# Patient Record
Sex: FEMALE | Race: WHITE | Employment: FULL TIME | URBAN - METROPOLITAN AREA
[De-identification: names, ages, dates, MRNs, and addresses within clinical notes are randomized per-mention and may not be internally consistent; named-entity substitution may affect disease eponyms.]

---

## 2017-11-07 ENCOUNTER — OFFICE VISIT (OUTPATIENT)
Dept: ORTHOPEDIC SURGERY | Age: 37
End: 2017-11-07

## 2017-11-07 VITALS
DIASTOLIC BLOOD PRESSURE: 78 MMHG | BODY MASS INDEX: 30.22 KG/M2 | HEART RATE: 74 BPM | HEIGHT: 61 IN | SYSTOLIC BLOOD PRESSURE: 112 MMHG | WEIGHT: 160.05 LBS

## 2017-11-07 DIAGNOSIS — G89.29 CHRONIC PAIN OF LEFT ANKLE: Primary | ICD-10-CM

## 2017-11-07 DIAGNOSIS — M25.572 CHRONIC PAIN OF LEFT ANKLE: Primary | ICD-10-CM

## 2017-11-07 PROCEDURE — 4004F PT TOBACCO SCREEN RCVD TLK: CPT | Performed by: PODIATRIST

## 2017-11-07 PROCEDURE — L1902 AFO ANKLE GAUNTLET PRE OTS: HCPCS | Performed by: PODIATRIST

## 2017-11-07 PROCEDURE — 99203 OFFICE O/P NEW LOW 30 MIN: CPT | Performed by: PODIATRIST

## 2017-11-07 PROCEDURE — G8484 FLU IMMUNIZE NO ADMIN: HCPCS | Performed by: PODIATRIST

## 2017-11-07 PROCEDURE — G8427 DOCREV CUR MEDS BY ELIG CLIN: HCPCS | Performed by: PODIATRIST

## 2017-11-07 PROCEDURE — G8417 CALC BMI ABV UP PARAM F/U: HCPCS | Performed by: PODIATRIST

## 2017-11-07 NOTE — PROGRESS NOTES
HISTORY OF PRESENT ILLNESS: This is an initial visit for a 72-year-old female with a chief complaint of left lateral ankle and foot pain. There is no history of recent ankle injury. In the July, she slipped all walking down the steps on her back deck. She went to the ER at that time was diagnosed with a sprain. She use an Aircast ankle brace for a short time which seemed to help, however she stopped using that. Pain is present with weightbearing and twisting motions of the ankle. The pain is best relieved with rest, ice, and elevation. She states that her pain level now can get up to 9/10. FAMILY HISTORY: Documented in chart. SOCIAL HISTORY:  Documented in chart. REVIEW OF SYSTEMS:  The patient denies any problems with cardiovascular, pulmonary, gastrointestinal, neurologic, urologic, genitourinary, psychiatric, dermatologic, and HEENT systems. PHYSICAL EXAMINATION: The area of greatest palpable tenderness is at the  left lateral ankle, over the ATF ligament. An anterior drawer test does  not reproduce any gross instability. There is mild edema of the  lateral ankle. No open lesions or fracture blisters are present. Neurovascular  status is grossly intact to the foot and ankle. There is no pain over the deltoid  ligament. There is no pain over the Achilles tendon and this is palpated to be  intact. Fengs test is negative for a complete rupture of the Achilles  tendon. The patient is able to dorsiflex and plantarflex the foot, as well as  sanjeev and invert independently. RADIOGRAPHS: Three weightbearing x-ray views of the left ankle were evaluated. No acute fractures or dislocations are noted. The ankle joint is in  excellent alignment. ASSESSMENT: Chronic Lateral Ankle Sprain with left ankle pain. PLAN: The patient was educated on the pathology and its treatment options. Physical therapy was prescribed for the patient to rehabilitate the ankle.     An Aircast ankle brace was applied to the patient's left ankle. Rest, ice, and elevation are to be used to relieve pain. Overall activity is to be decreased  in the short-term. The patient will return after physical therapy. Procedures    Aircast Air Sport Ankle Brace     Patient was prescribed an Aircast Air Sport Brace. The left ankle will require stabilization / immobilization from this semi-rigid / rigid orthosis to improve their function. The orthosis will assist in protecting the affected area, provide functional support and facilitate healing. The patient was educated and fit by a healthcare professional with expert knowledge and specialization in brace application while under the direct supervision of the treating physician. Verbal and written instructions for the use of and application of this item were provided. They were instructed to contact the office immediately should the brace result in increased pain, decreased sensation, increased swelling or worsening of the condition.

## 2019-07-21 ENCOUNTER — HOSPITAL ENCOUNTER (EMERGENCY)
Age: 39
Discharge: HOME | End: 2019-07-21
Attending: EMERGENCY MEDICINE

## 2019-07-21 VITALS
OXYGEN SATURATION: 98 % | TEMPERATURE: 98.6 F | BODY MASS INDEX: 33.99 KG/M2 | WEIGHT: 180 LBS | HEIGHT: 61 IN | HEART RATE: 70 BPM | RESPIRATION RATE: 16 BRPM | DIASTOLIC BLOOD PRESSURE: 92 MMHG | SYSTOLIC BLOOD PRESSURE: 128 MMHG

## 2019-07-21 DIAGNOSIS — K04.7 DENTAL INFECTION: Primary | ICD-10-CM

## 2019-07-21 PROCEDURE — 99282 EMERGENCY DEPT VISIT SF MDM: CPT

## 2019-07-21 PROCEDURE — 6370000000 HC RX 637 (ALT 250 FOR IP): Performed by: EMERGENCY MEDICINE

## 2019-07-21 RX ORDER — CLINDAMYCIN HYDROCHLORIDE 150 MG/1
450 CAPSULE ORAL ONCE
Status: COMPLETED | OUTPATIENT
Start: 2019-07-21 | End: 2019-07-21

## 2019-07-21 RX ORDER — CLINDAMYCIN HYDROCHLORIDE 150 MG/1
450 CAPSULE ORAL 3 TIMES DAILY
Qty: 63 CAPSULE | Refills: 0 | Status: SHIPPED | OUTPATIENT
Start: 2019-07-21 | End: 2019-07-28

## 2019-07-21 RX ORDER — OXYCODONE HYDROCHLORIDE AND ACETAMINOPHEN 5; 325 MG/1; MG/1
1 TABLET ORAL EVERY 6 HOURS PRN
Qty: 6 TABLET | Refills: 0 | Status: SHIPPED | OUTPATIENT
Start: 2019-07-21 | End: 2019-07-24

## 2019-07-21 RX ADMIN — CLINDAMYCIN HYDROCHLORIDE 450 MG: 150 CAPSULE ORAL at 09:04

## 2019-07-21 ASSESSMENT — PAIN SCALES - GENERAL
PAINLEVEL_OUTOF10: 4
PAINLEVEL_OUTOF10: 4

## 2019-07-21 ASSESSMENT — PAIN DESCRIPTION - PAIN TYPE
TYPE: ACUTE PAIN
TYPE: ACUTE PAIN

## 2019-07-21 ASSESSMENT — PAIN DESCRIPTION - FREQUENCY
FREQUENCY: INTERMITTENT
FREQUENCY: INTERMITTENT

## 2019-07-21 ASSESSMENT — PAIN DESCRIPTION - DESCRIPTORS
DESCRIPTORS: ACHING;DISCOMFORT
DESCRIPTORS: ACHING;DISCOMFORT

## 2019-07-21 ASSESSMENT — PAIN DESCRIPTION - ORIENTATION
ORIENTATION: RIGHT
ORIENTATION: RIGHT

## 2019-07-21 ASSESSMENT — PAIN DESCRIPTION - LOCATION
LOCATION: TEETH;EAR
LOCATION: EAR

## 2019-07-21 NOTE — ED PROVIDER NOTES
1\" (1.549 m)   Wt 180 lb (81.6 kg)   LMP 07/01/2019   SpO2 98%   BMI 34.01 kg/m²   GENERAL APPEARANCE: Awake and alert. Cooperative. In mild pain distress. HEAD: Normocephalic. Atraumatic. EYES: PERRL. EOM's grossly intact. ENT: Mucous membranes are pink and moist.  Right upper mild gum swelling, but no area of fluctuance. Tender in the right upper molar area which reproduces symptoms. Bilateral tympanic membranes normal  NECK: Supple. HEART: RRR. No   LUNGS: Respirations unlabored. Good air exchange. ABDOMEN: Soft. Non-distended. Non-tender. No masses. No organomegaly. No guarding or rebound. EXTREMITIES: No peripheral edema. Moves all extremities equally. All extremities neurovascularly intact. SKIN: Warm and dry. No acute rashes. NEUROLOGICAL: Alert and oriented. Strength 5/5, sensation intact. Gait normal.   PSYCHIATRIC: Normal mood and affect. No HI or SI expressed to me. RADIOLOGY    See below     EKG:     See below      ED COURSE/MDM    Patient with dental pain, tenderness consistent with possible dental infection. Will treat as such and referred to dentistry. Given brief course of pain medication until she can get into see them. Discharged in stable condition         Old records were reviewed when applicable.  The ED course and plan were reviewed and results discussed with the patient    CLINICAL IMPRESSION and DISPOSITION  Alex Huang was stable and diagnosed with right upper dental infection    Patient was treated with clindamycin, PRN pain medication      CRITICAL CARE TIME:   N/A                      Bill Marcus DO  07/21/19 8650

## 2019-07-22 ENCOUNTER — HOSPITAL ENCOUNTER (EMERGENCY)
Age: 39
Discharge: HOME | End: 2019-07-22
Attending: EMERGENCY MEDICINE

## 2019-07-22 VITALS
WEIGHT: 175 LBS | HEIGHT: 61 IN | HEART RATE: 70 BPM | DIASTOLIC BLOOD PRESSURE: 107 MMHG | BODY MASS INDEX: 33.04 KG/M2 | SYSTOLIC BLOOD PRESSURE: 136 MMHG | RESPIRATION RATE: 18 BRPM | TEMPERATURE: 98.4 F | OXYGEN SATURATION: 100 %

## 2019-07-22 DIAGNOSIS — K04.7 DENTAL ABSCESS: Primary | ICD-10-CM

## 2019-07-22 PROCEDURE — 96372 THER/PROPH/DIAG INJ SC/IM: CPT

## 2019-07-22 PROCEDURE — 99283 EMERGENCY DEPT VISIT LOW MDM: CPT

## 2019-07-22 PROCEDURE — 6360000002 HC RX W HCPCS: Performed by: EMERGENCY MEDICINE

## 2019-07-22 RX ORDER — KETOROLAC TROMETHAMINE 30 MG/ML
30 INJECTION, SOLUTION INTRAMUSCULAR; INTRAVENOUS ONCE
Status: COMPLETED | OUTPATIENT
Start: 2019-07-22 | End: 2019-07-22

## 2019-07-22 RX ORDER — MELOXICAM 7.5 MG/1
7.5 TABLET ORAL DAILY
Qty: 30 TABLET | Refills: 3 | Status: SHIPPED | OUTPATIENT
Start: 2019-07-22 | End: 2020-06-15

## 2019-07-22 RX ADMIN — KETOROLAC TROMETHAMINE 30 MG: 30 INJECTION, SOLUTION INTRAMUSCULAR at 19:55

## 2019-07-22 ASSESSMENT — PAIN DESCRIPTION - DESCRIPTORS: DESCRIPTORS: ACHING;THROBBING

## 2019-07-22 ASSESSMENT — PAIN SCALES - GENERAL: PAINLEVEL_OUTOF10: 9

## 2019-07-22 ASSESSMENT — PAIN DESCRIPTION - PROGRESSION: CLINICAL_PROGRESSION: GRADUALLY WORSENING

## 2019-07-22 ASSESSMENT — PAIN DESCRIPTION - ONSET: ONSET: GRADUAL

## 2019-07-22 ASSESSMENT — PAIN DESCRIPTION - LOCATION: LOCATION: TEETH

## 2019-07-22 ASSESSMENT — PAIN DESCRIPTION - PAIN TYPE: TYPE: ACUTE PAIN

## 2019-07-22 ASSESSMENT — PAIN DESCRIPTION - ORIENTATION: ORIENTATION: RIGHT;UPPER

## 2019-07-22 NOTE — ED PROVIDER NOTES
the clindamycin 1 day to try to work which is definitely not enough time. She is given Percocet by Dr. Saira Gamez for which she does have some more. I think she had a misunderstanding as to how long it might take the antibiotics to work. She did try to get into the dentist today which was unsuccessful but she does have an appointment for Thursday.   So she should follow-up with the dentist I will add a nonsteroidal anti-inflammatory to the treatment regimen         The ED course and plan were reviewed and results discussed with the patient      CLINICAL IMPRESSION and DISPOSITION    [facility] was stable and diagnosed with dental abscess    Patient was treated with Toradol     Yasmani Fregoso MD  07/22/19 1947

## 2020-06-15 ENCOUNTER — HOSPITAL ENCOUNTER (EMERGENCY)
Age: 40
Discharge: HOME | End: 2020-06-15

## 2020-06-15 VITALS
RESPIRATION RATE: 16 BRPM | WEIGHT: 155 LBS | BODY MASS INDEX: 29.27 KG/M2 | TEMPERATURE: 99.1 F | HEIGHT: 61 IN | OXYGEN SATURATION: 96 % | SYSTOLIC BLOOD PRESSURE: 107 MMHG | HEART RATE: 85 BPM | DIASTOLIC BLOOD PRESSURE: 85 MMHG

## 2020-06-15 PROCEDURE — 99282 EMERGENCY DEPT VISIT SF MDM: CPT

## 2020-06-15 PROCEDURE — 6370000000 HC RX 637 (ALT 250 FOR IP): Performed by: PHYSICIAN ASSISTANT

## 2020-06-15 RX ORDER — CLINDAMYCIN HYDROCHLORIDE 150 MG/1
300 CAPSULE ORAL ONCE
Status: COMPLETED | OUTPATIENT
Start: 2020-06-15 | End: 2020-06-15

## 2020-06-15 RX ORDER — NAPROXEN 500 MG/1
500 TABLET ORAL 2 TIMES DAILY WITH MEALS
Qty: 20 TABLET | Refills: 0 | Status: SHIPPED | OUTPATIENT
Start: 2020-06-15 | End: 2020-11-06 | Stop reason: ALTCHOICE

## 2020-06-15 RX ORDER — CLINDAMYCIN HYDROCHLORIDE 300 MG/1
300 CAPSULE ORAL 3 TIMES DAILY
Qty: 30 CAPSULE | Refills: 0 | Status: SHIPPED | OUTPATIENT
Start: 2020-06-15 | End: 2020-06-25

## 2020-06-15 RX ORDER — NAPROXEN 250 MG/1
500 TABLET ORAL ONCE
Status: COMPLETED | OUTPATIENT
Start: 2020-06-15 | End: 2020-06-15

## 2020-06-15 RX ADMIN — CLINDAMYCIN HYDROCHLORIDE 300 MG: 150 CAPSULE ORAL at 19:20

## 2020-06-15 RX ADMIN — NAPROXEN 500 MG: 250 TABLET ORAL at 19:20

## 2020-06-15 ASSESSMENT — PAIN DESCRIPTION - FREQUENCY: FREQUENCY: CONTINUOUS

## 2020-06-15 ASSESSMENT — PAIN DESCRIPTION - PROGRESSION: CLINICAL_PROGRESSION: GRADUALLY WORSENING

## 2020-06-15 ASSESSMENT — PAIN DESCRIPTION - ONSET: ONSET: SUDDEN

## 2020-06-15 ASSESSMENT — PAIN DESCRIPTION - PAIN TYPE: TYPE: ACUTE PAIN

## 2020-06-15 ASSESSMENT — ENCOUNTER SYMPTOMS
SHORTNESS OF BREATH: 0
TRISMUS: 0
VOMITING: 0
TROUBLE SWALLOWING: 0

## 2020-06-15 ASSESSMENT — PAIN DESCRIPTION - ORIENTATION: ORIENTATION: LEFT;LOWER

## 2020-06-15 ASSESSMENT — PAIN DESCRIPTION - DESCRIPTORS: DESCRIPTORS: DULL;SHARP

## 2020-06-15 ASSESSMENT — PAIN - FUNCTIONAL ASSESSMENT: PAIN_FUNCTIONAL_ASSESSMENT: PREVENTS OR INTERFERES SOME ACTIVE ACTIVITIES AND ADLS

## 2020-06-15 ASSESSMENT — PAIN DESCRIPTION - LOCATION: LOCATION: JAW;TEETH;EAR

## 2020-06-15 ASSESSMENT — PAIN SCALES - GENERAL
PAINLEVEL_OUTOF10: 9

## 2020-06-15 ASSESSMENT — ACTIVITIES OF DAILY LIVING (ADL): EFFECT OF PAIN ON DAILY ACTIVITIES: DECREASE IN APPETITE

## 2020-06-15 NOTE — ED PROVIDER NOTES
contractions     was on PO Brethine. Stopped at 36 wks    Smoker          SURGICAL HISTORY     Past Surgical History:   Procedure Laterality Date    CHOLECYSTECTOMY      TONSILLECTOMY      TYMPANOSTOMY TUBE PLACEMENT           [address]       Discharge Medication List as of 6/15/2020  7:18 PM      CONTINUE these medications which have NOT CHANGED    Details   ibuprofen (ADVIL;MOTRIN) 800 MG tablet Take 1 tablet by mouth every 8 hours as needed for Pain, Disp-30 tablet, R-0Print      fluticasone (FLONASE) 50 MCG/ACT nasal spray 1 spray by Nasal route daily, Disp-1 Bottle, R-0Print               ALLERGIES     Amoxicillin; Codeine; Erythrocin; Lorabid [loracarbef];  Penicillins; and Sulfa antibiotics    FAMILYHISTORY       Family History   Problem Relation Age of Onset    Arthritis Mother     Cancer Maternal Grandfather         bladder    High Blood Pressure Maternal Grandmother     Stroke Maternal Grandmother           SOCIAL HISTORY       Social History     Socioeconomic History    Marital status:      Spouse name: None    Number of children: None    Years of education: None    Highest education level: None   Occupational History    None   Social Needs    Financial resource strain: None    Food insecurity     Worry: None     Inability: None    Transportation needs     Medical: None     Non-medical: None   Tobacco Use    Smoking status: Current Some Day Smoker     Packs/day: 0.50     Years: 5.00     Pack years: 2.50     Types: Cigarettes    Smokeless tobacco: Never Used   Substance and Sexual Activity    Alcohol use: Yes     Comment: rarely    Drug use: No    Sexual activity: Yes     Partners: Male   Lifestyle    Physical activity     Days per week: None     Minutes per session: None    Stress: None   Relationships    Social connections     Talks on phone: None     Gets together: None     Attends Moravian service: None     Active member of club or organization: None Attends meetings of clubs or organizations: None     Relationship status: None    Intimate partner violence     Fear of current or ex partner: None     Emotionally abused: None     Physically abused: None     Forced sexual activity: None   Other Topics Concern    None   Social History Narrative    None       SCREENINGS             PHYSICAL EXAM    (up to 7 for level 4, 8 or more for level 5)     ED Triage Vitals [06/15/20 1823]   BP Temp Temp Source Pulse Resp SpO2 Height Weight   (!) 123/92 99.1 °F (37.3 °C) Oral 76 16 94 % 5' 1\" (1.549 m) 155 lb (70.3 kg)       Physical Exam  Vitals signs and nursing note reviewed. Constitutional:       Appearance: She is well-developed. She is not ill-appearing or toxic-appearing. HENT:      Head: Normocephalic and atraumatic. Right Ear: Tympanic membrane and ear canal normal. No drainage or swelling. Left Ear: Tympanic membrane normal. No drainage or swelling. Ears:      Comments: Cerumen left ear canal partially obscuring view of TM what I can see appears normal without erythema. Mouth/Throat:      Mouth: Mucous membranes are moist. No angioedema. Dentition: Abnormal dentition. Dental tenderness and dental caries present. Tongue: Tongue does not deviate from midline. Pharynx: Oropharynx is clear. Uvula midline. No pharyngeal swelling, oropharyngeal exudate, posterior oropharyngeal erythema or uvula swelling. Neck:      Musculoskeletal: Normal range of motion and neck supple. Cardiovascular:      Rate and Rhythm: Normal rate and regular rhythm. Heart sounds: Normal heart sounds. Pulmonary:      Effort: Pulmonary effort is normal. No respiratory distress. Breath sounds: Normal breath sounds. No stridor. No wheezing, rhonchi or rales. Skin:     General: Skin is warm and dry. Neurological:      Mental Status: She is alert and oriented to person, place, and time. Motor: No abnormal muscle tone.    Psychiatric:

## 2020-11-06 ENCOUNTER — OFFICE VISIT (OUTPATIENT)
Dept: ORTHOPEDIC SURGERY | Age: 40
End: 2020-11-06
Payer: MEDICAID

## 2020-11-06 ENCOUNTER — TELEPHONE (OUTPATIENT)
Dept: ORTHOPEDIC SURGERY | Age: 40
End: 2020-11-06

## 2020-11-06 VITALS — BODY MASS INDEX: 29.27 KG/M2 | HEIGHT: 61 IN | WEIGHT: 155 LBS

## 2020-11-06 PROCEDURE — 4004F PT TOBACCO SCREEN RCVD TLK: CPT | Performed by: ORTHOPAEDIC SURGERY

## 2020-11-06 PROCEDURE — G8419 CALC BMI OUT NRM PARAM NOF/U: HCPCS | Performed by: ORTHOPAEDIC SURGERY

## 2020-11-06 PROCEDURE — 99203 OFFICE O/P NEW LOW 30 MIN: CPT | Performed by: ORTHOPAEDIC SURGERY

## 2020-11-06 PROCEDURE — G8484 FLU IMMUNIZE NO ADMIN: HCPCS | Performed by: ORTHOPAEDIC SURGERY

## 2020-11-06 PROCEDURE — G8428 CUR MEDS NOT DOCUMENT: HCPCS | Performed by: ORTHOPAEDIC SURGERY

## 2020-11-06 NOTE — PROGRESS NOTES
Chief Complaint   Patient presents with    New Patient     Rt Knee: Ongoing chronic pain, was a previous cheerleader and played softball, was told years ago that her cartialge was fraying, has current c/o patella pain, popping, pain with going up and down stairs       HISTORY OF PRESENT ILLNESS    Flor Leonardo is a 47 y.o. female. Presents for evaluation of her right knee. She states for years she is had some chronic pain in the anterolateral aspect of her knee, but last couple weeks she had a fall and since that time is been hurting worse. She has had some popping and a mild swelling. No significant locking or instability. No prior surgeries. She states that years ago she was told that she may have some fraying of her cartilage and did have a couple cortisone injections. No other prior interventions for her knee. She is been taking some ibuprofen. She is also tried some ice. Activities/occupation:     PAST MEDICAL/SURGICAL HISTORY     Past Medical History:   Diagnosis Date    HPV in female     Hx of migraines      contractions     was on PO Brethine.  Stopped at 36 wks    Smoker        Past Surgical History:   Procedure Laterality Date    CHOLECYSTECTOMY      TONSILLECTOMY      TYMPANOSTOMY TUBE PLACEMENT         Social History     Socioeconomic History    Marital status:      Spouse name: Not on file    Number of children: Not on file    Years of education: Not on file    Highest education level: Not on file   Occupational History    Not on file   Social Needs    Financial resource strain: Not on file    Food insecurity     Worry: Not on file     Inability: Not on file   [employer] needs     Medical: Not on file     Non-medical: Not on file   Tobacco Use    Smoking status: Current Some Day Smoker     Packs/day: 0.50     Years: 5.00     Pack years: 2.50     Types: Cigarettes    Smokeless tobacco: Never Used   Substance and Sexual Activity    Alcohol use: Yes     Comment: rarely    Drug use: No    Sexual activity: Yes     Partners: Male   Lifestyle    Physical activity     Days per week: Not on file     Minutes per session: Not on file    Stress: Not on file   Relationships    Social connections     Talks on phone: Not on file     Gets together: Not on file     Attends Evangelical service: Not on file     Active member of club or organization: Not on file     Attends meetings of clubs or organizations: Not on file     Relationship status: Not on file    Intimate partner violence     Fear of current or ex partner: Not on file     Emotionally abused: Not on file     Physically abused: Not on file     Forced sexual activity: Not on file   Other Topics Concern    Not on file   Social History Narrative    Not on file       Family History   Problem Relation Age of Onset    Arthritis Mother     Cancer Maternal Grandfather         bladder    High Blood Pressure Maternal Grandmother     Stroke Maternal Grandmother           REVIEW OF SYSTEMS  Pertinent items are noted in HPI  Review of systems reviewed from Patient History Form dated on 11/6/2020 and available in the patient's chart under the Media tab. PHYSICAL EXAM    Vitals:    11/06/20 0823   Weight: 155 lb (70.3 kg)   Height: 5' 1\" (1.549 m)       General Exam:   Constitutional: Patient is adequately groomed with no evidence of malnutrition  Mental Status: The patient is oriented to time, place and person. The patient's mood and affect are appropriate. Lymphatic: The lymphatic examination bilaterally reveals all areas to be without enlargement or induration. Neurological: The patient has good coordination. There is no weakness or sensory deficit. Antalgic gait:  Yes    Bilateral lower extremities are neurovascularly intact with symmetric light touch sensation and distal pulses. Left Knee Exam:  No skin lesions, erythema, or warmth. No joint effusion. No joint line tenderness. Negative Juan. Ligaments stable. Quad tone good. ROM 0-140    Right Knee Exam:  No skin lesions, erythema, or warmth. Effusion: 0+/4  Range Of Motion:  0-140    Hyperextension Pain:    negative  Hyperflexion Pain:     positive  Juan:      Mild  Medial Joint Line Tenderness:   Minimal  Lateral Joint Line Tenderness: Moderate with some tenderness of the anterolateral aspect    Anterior Drawer:   negative  Posterior Drawer:   negative  Lachman:   negative  Pivot Shift:  not done  Valgus Stress:   negative  Varus Stress:   negative      REVIEW OF IMAGING  4 Views AP/PA 45 degree/Lateral/Sunrise of the right lower extremity dated 11/6/2020 demonstrate no acute fracture. No dislocation. No major degenerative changes. Normal alignment. No visible bony lesions or loose bodies. MRI available for review today: no      ASSESSMENT  31-year-old female with right knee pain, patellofemoral pain, mild joint line pain      PLAN  -Diagnosis and treatment options discussed in detail today  -We will begin with conservative treatment and provide her with home physical therapy exercise program  -In addition we will give her prescription for diclofenac. She was asked to should not take more than 1 NSAID at a time  The patient was advised that NSAID-type medications have two very important potential side effects: gastrointestinal irritation including hemorrhage and renal injuries. They were asked to take the medication with food and to stop if they experience any GI upset. They were instructed to call for vomiting, abdominal pain or black/bloody stools. Renal function testing should be provided per primary care provider periodically.   The patient expresses understanding of these issues and questions were answered.  -Ice, activity modification  -We discussed she continues have significant symptoms she can follow-up in 4 to 6 weeks for repeat evaluation we can likely get an MRI and if there are issues in interim she will contact the office    Gil Jamison. MD Noreen [facility]in 60 [facility] of [facility] ([facility]      Voice Recognition Dictation disclaimer: Please note that portions of this chart were generated using Dragon dictation software. Although every effort was made to ensure the accuracy of this automated transcription, some errors in transcription may have occurred.

## 2021-10-15 ENCOUNTER — CLINICAL DOCUMENTATION (OUTPATIENT)
Dept: OTHER | Age: 41
End: 2021-10-15

## 2022-04-24 PROCEDURE — 99283 EMERGENCY DEPT VISIT LOW MDM: CPT

## 2022-04-25 ENCOUNTER — APPOINTMENT (OUTPATIENT)
Dept: GENERAL RADIOLOGY | Age: 42
End: 2022-04-25
Payer: MEDICAID

## 2022-04-25 ENCOUNTER — HOSPITAL ENCOUNTER (EMERGENCY)
Age: 42
Discharge: HOME | End: 2022-04-25
Attending: EMERGENCY MEDICINE
Payer: MEDICAID

## 2022-04-25 VITALS
HEIGHT: 61 IN | BODY MASS INDEX: 32.1 KG/M2 | HEART RATE: 78 BPM | RESPIRATION RATE: 18 BRPM | SYSTOLIC BLOOD PRESSURE: 146 MMHG | TEMPERATURE: 98.5 F | OXYGEN SATURATION: 96 % | DIASTOLIC BLOOD PRESSURE: 70 MMHG | WEIGHT: 170 LBS

## 2022-04-25 DIAGNOSIS — S93.401A SPRAIN OF RIGHT ANKLE, UNSPECIFIED LIGAMENT, INITIAL ENCOUNTER: Primary | ICD-10-CM

## 2022-04-25 PROCEDURE — 73630 X-RAY EXAM OF FOOT: CPT

## 2022-04-25 PROCEDURE — 73610 X-RAY EXAM OF ANKLE: CPT

## 2022-04-25 ASSESSMENT — ENCOUNTER SYMPTOMS
DIARRHEA: 0
SHORTNESS OF BREATH: 0
NAUSEA: 0
TROUBLE SWALLOWING: 0
VOMITING: 0
VOICE CHANGE: 0

## 2022-04-25 NOTE — ED PROVIDER NOTES
[address] [facility]  eMERGENCY dEPARTMENT eNCOUnter      Pt Name: Jose Colon  MRN: [id number]  [place] 1980  Date of evaluation: 4/24/2022  Provider: Hector Saunders MD    [address] [place] [address]       Chief Complaint   Patient presents with    Ankle Pain     patient states that tonight she was walking down her steps when her dog got under her feet causing her to trip and fall. pt. c/o right ankle and foot pain. HISTORY OF PRESENT ILLNESS   (Location/Symptom, Timing/Onset, Context/Setting, Quality, Duration, Modifying Factors, Severity)  Note limiting factors. Jose Colon is a 46 y.o. female who reports 1 day of right lateral ankle pain and swelling. The patient reports 1 day ago she was walking down her steps and her dog got underneath her feet causing her to stumble and rolled her right ankle. She has any injury to her head neck back or any location other than her right ankle and right foot. She rates her pain is moderate constant and worsening. She reports walking and bearing weight worsens the pain nothing improves it. She denies any numbness or weakness. HPI    Nursing Notes were reviewed. REVIEW OFSYSTEMS    (2-9 systems for level 4, 10 or more for level 5)     Review of Systems   Constitutional: Negative for appetite change and fever. HENT: Negative for trouble swallowing and voice change. Eyes: Negative for visual disturbance. Respiratory: Negative for shortness of breath. Cardiovascular: Negative for chest pain and palpitations. Gastrointestinal: Negative for diarrhea, nausea and vomiting. Genitourinary: Negative for dysuria. Musculoskeletal: Positive for arthralgias. Negative for gait problem. Neurological: Negative for seizures and syncope. Psychiatric/Behavioral: Negative for self-injury and suicidal ideas. Except as noted above the remainder of the review of systems was reviewed and negative.        PAST MEDICAL HISTORY     Past Medical History:   Diagnosis Date    HPV in female     Hx of migraines      contractions     was on PO Brethine.  Stopped at 36 wks    Smoker          SURGICAL HISTORY       Past Surgical History:   Procedure Laterality Date    CHOLECYSTECTOMY      TONSILLECTOMY      TYMPANOSTOMY TUBE PLACEMENT           CURRENT MEDICATIONS       Discharge Medication List as of 2022  2:24 AM      CONTINUE these medications which have NOT CHANGED    Details   diclofenac (VOLTAREN) 50 MG EC tablet Take 1 tablet by mouth 2 times daily (with meals), Disp-60 tablet,R-3Normal      fluticasone (FLONASE) 50 MCG/ACT nasal spray 1 spray by Nasal route daily, Disp-1 Bottle, R-0Print             ALLERGIES     Amoxicillin, Codeine, Erythrocin, Lorabid [loracarbef], Penicillins, and Sulfa antibiotics    FAMILY HISTORY       Family History   Problem Relation Age of Onset    Arthritis Mother     Cancer Maternal Grandfather         bladder    High Blood Pressure Maternal Grandmother     Stroke Maternal Grandmother           SOCIAL HISTORY       Social History     Socioeconomic History    Marital status:      Spouse name: None    Number of children: None    Years of education: None    Highest education level: None   Occupational History    None   Tobacco Use    Smoking status: Current Some Day Smoker     Packs/day: 0.50     Years: 5.00     Pack years: 2.50     Types: Cigarettes    Smokeless tobacco: Never Used   Vaping Use    Vaping Use: Never used   Substance and Sexual Activity    Alcohol use: Yes     Comment: rarely    Drug use: No    Sexual activity: Yes     Partners: Male   Other Topics Concern    None   Social History Narrative    None     Social Determinants of Health     Financial Resource Strain:     Difficulty of Paying Living Expenses: Not on file   Food Insecurity:     Worried About Running Out of Food in the Last Year: Not on file    Laila of Food in the Last Year: Not on CHANELLE Smalls Needs:     Lack of Transportation (Medical): Not on file    Lack of Transportation (Non-Medical): Not on file   Physical Activity:     Days of Exercise per Week: Not on file    Minutes of Exercise per Session: Not on file   Stress:     Feeling of Stress : Not on file   Social Connections:     Frequency of Communication with Friends and Family: Not on file    Frequency of Social Gatherings with Friends and Family: Not on file    Attends Scientology Services: Not on file    Active Member of [address] [place] [address] or Organizations: Not on file    Attends Club or Organization Meetings: Not on file    Marital Status: Not on file   Intimate Partner Violence:     Fear of Current or Ex-Partner: Not on file    Emotionally Abused: Not on file    Physically Abused: Not on file    Sexually Abused: Not on file   Housing Stability:     Unable to Pay for Housing in the Last Year: Not on file    Number of [place] in the Last Year: Not on file    Unstable Housing in the Last Year: Not on file         PHYSICAL EXAM    (up to 7 for level 4, 8 or more for level 5)     ED Triage Vitals [04/25/22 0013]   BP Temp Temp Source Pulse Resp SpO2 Height Weight   (!) 146/70 98.5 °F (36.9 °C) Oral 78 18 96 % 5' 1\" (1.549 m) 170 lb (77.1 kg)       Physical Exam  Constitutional:       General: She is not in acute distress. Appearance: She is well-developed. HENT:      Head: Normocephalic and atraumatic. Eyes:      Conjunctiva/sclera: Conjunctivae normal.   Neck:      Vascular: No JVD. Cardiovascular:      Rate and Rhythm: Normal rate. Pulses: Normal pulses. Comments: 2+ DP pulses to right lower extremity. Normal cap refill to toes right foot  Pulmonary:      Effort: Pulmonary effort is normal. No respiratory distress. Abdominal:      Tenderness: There is no abdominal tenderness. There is no rebound. Musculoskeletal:         General: Tenderness present. No deformity. Comments: Tenderness to right lateral malleolus. No palpable deformity. Full range of motion. Neurological:      General: No focal deficit present. Mental Status: She is alert and oriented to person, place, and time. Comments: Sensation intact all nerve distributions of right lower extremity. Patient ambulatory but with limp to right ankle         DIAGNOSTIC RESULTS         RADIOLOGY:     Interpretation per the Radiologist below, if available at the time of this note:    XR ANKLE RIGHT (MIN 3 VIEWS)   Final Result   No acute abnormality of the foot or ankle. XR FOOT RIGHT (MIN 3 VIEWS)   Final Result   No acute abnormality of the foot or ankle. ED BEDSIDE ULTRASOUND:   Performed by ED Physician - none    LABS:  Labs Reviewed - No data to display    All otherlabs were within normal range or not returned as of this dictation. EMERGENCY DEPARTMENT COURSE and DIFFERENTIAL DIAGNOSIS/MDM:   Vitals:    Vitals:    04/25/22 0013   BP: (!) 146/70   Pulse: 78   Resp: 18   Temp: 98.5 °F (36.9 °C)   TempSrc: Oral   SpO2: 96%   Weight: 170 lb (77.1 kg)   Height: 5' 1\" (1.549 m)         MDM  I estimate there is low risk for lisfranc injury and fracture of ankle or foot. Stress Fracture still a possibility due to early nature of presentation, but tenderness is diffuse and not localized. We discussed this possibility and the need for close follow-up with their primary provider for re-evaluation. Patient placed in walking boot. We also discussed the RICE principles for injury care. I estimate there is low risk for COMPARTMENT SYNDROME, DEEP VENOUS THROMBOSIS, SEPTIC ARTHRITIS, TENDON OR NEUROVASCULAR INJURY, thus I consider the discharge disposition reasonable. The patient and I have discussed the diagnosis and risks, and we agree with discharging home to follow-up with their primary doctor or the referral orthopedist. We also discussed returning to the Emergency Department immediately if new or worsening symptoms occur.  We have discussed the symptoms which are most concerning (e.g., changing or worsening pain, numbness, weakness) that necessitate immediate return         Procedures    FINAL IMPRESSION      1. Sprain of right ankle, unspecified ligament, initial encounter          DISPOSITION/PLAN   DISPOSITION Decision To Discharge 04/25/2022 02:17:06 AM      PATIENT REFERRED TO:  [address] [facility]  [address] [facility] [address]  In 5 days      [place] [id number] [facility]  [address]  [phone]    If symptoms worsen      DISCHARGE MEDICATIONS:  Discharge Medication List as of 4/25/2022  2:24 AM             (Please note that portions of this note were completed with a voice recognition program.  Efforts were made to edit the dictations but occasionally words aremis-transcribed. )    Renee Gloria MD (electronically signed)  Attending Emergency Physician           Renee Gloria MD  04/25/22 3899

## 2023-04-05 ENCOUNTER — HOSPITAL ENCOUNTER (OUTPATIENT)
Dept: GENERAL RADIOLOGY | Age: 43
Discharge: HOME | End: 2023-04-05
Payer: MEDICAID

## 2023-04-05 ENCOUNTER — HOSPITAL ENCOUNTER (OUTPATIENT)
Age: 43
Discharge: HOME | End: 2023-04-05
Payer: MEDICAID

## 2023-04-05 DIAGNOSIS — M25.551 RIGHT HIP PAIN: ICD-10-CM

## 2023-04-05 DIAGNOSIS — M54.50 LOW BACK PAIN, UNSPECIFIED BACK PAIN LATERALITY, UNSPECIFIED CHRONICITY, UNSPECIFIED WHETHER SCIATICA PRESENT: ICD-10-CM

## 2023-04-05 PROCEDURE — 73501 X-RAY EXAM HIP UNI 1 VIEW: CPT

## 2023-04-05 PROCEDURE — 72100 X-RAY EXAM L-S SPINE 2/3 VWS: CPT

## 2023-07-31 ENCOUNTER — HOSPITAL ENCOUNTER (EMERGENCY)
Age: 43
Discharge: ANOTHER HOSPITAL | End: 2023-08-01
Attending: STUDENT IN AN ORGANIZED HEALTH CARE EDUCATION/TRAINING PROGRAM
Payer: MEDICAID

## 2023-07-31 DIAGNOSIS — O46.90 VAGINAL BLEEDING IN PREGNANCY: Primary | ICD-10-CM

## 2023-07-31 DIAGNOSIS — I95.9 HYPOTENSION, UNSPECIFIED HYPOTENSION TYPE: ICD-10-CM

## 2023-07-31 LAB
ALBUMIN SERPL-MCNC: 3.8 G/DL (ref 3.4–5)
ALBUMIN/GLOB SERPL: 1.7 {RATIO} (ref 1.1–2.2)
ALP SERPL-CCNC: 88 U/L (ref 40–129)
ALT SERPL-CCNC: 23 U/L (ref 10–40)
ANION GAP SERPL CALCULATED.3IONS-SCNC: 15 MMOL/L (ref 3–16)
AST SERPL-CCNC: 19 U/L (ref 15–37)
B-HCG SERPL EIA 3RD IS-ACNC: 2396 MIU/ML
BASOPHILS # BLD: 0.1 K/UL (ref 0–0.2)
BASOPHILS NFR BLD: 0.8 %
BILIRUB SERPL-MCNC: <0.2 MG/DL (ref 0–1)
BUN SERPL-MCNC: 6 MG/DL (ref 7–20)
CALCIUM SERPL-MCNC: 9.1 MG/DL (ref 8.3–10.6)
CHLORIDE SERPL-SCNC: 104 MMOL/L (ref 99–110)
CO2 SERPL-SCNC: 20 MMOL/L (ref 21–32)
CREAT SERPL-MCNC: 0.7 MG/DL (ref 0.6–1.1)
DEPRECATED RDW RBC AUTO: 14.5 % (ref 12.4–15.4)
EOSINOPHIL # BLD: 0.2 K/UL (ref 0–0.6)
EOSINOPHIL NFR BLD: 1.4 %
GFR SERPLBLD CREATININE-BSD FMLA CKD-EPI: >60 ML/MIN/{1.73_M2}
GLUCOSE SERPL-MCNC: 173 MG/DL (ref 70–99)
HCT VFR BLD AUTO: 39.4 % (ref 36–48)
HGB BLD-MCNC: 13.3 G/DL (ref 12–16)
LYMPHOCYTES # BLD: 3 K/UL (ref 1–5.1)
LYMPHOCYTES NFR BLD: 23 %
MCH RBC QN AUTO: 30.4 PG (ref 26–34)
MCHC RBC AUTO-ENTMCNC: 33.7 G/DL (ref 31–36)
MCV RBC AUTO: 90.2 FL (ref 80–100)
MONOCYTES # BLD: 0.7 K/UL (ref 0–1.3)
MONOCYTES NFR BLD: 5.3 %
NEUTROPHILS # BLD: 9.1 K/UL (ref 1.7–7.7)
NEUTROPHILS NFR BLD: 69.5 %
PLATELET # BLD AUTO: 260 K/UL (ref 135–450)
PMV BLD AUTO: 8.8 FL (ref 5–10.5)
POTASSIUM SERPL-SCNC: 3.9 MMOL/L (ref 3.5–5.1)
PROT SERPL-MCNC: 6.1 G/DL (ref 6.4–8.2)
RBC # BLD AUTO: 4.37 M/UL (ref 4–5.2)
SODIUM SERPL-SCNC: 139 MMOL/L (ref 136–145)
WBC # BLD AUTO: 13.1 K/UL (ref 4–11)

## 2023-07-31 PROCEDURE — 36415 COLL VENOUS BLD VENIPUNCTURE: CPT

## 2023-07-31 PROCEDURE — 86900 BLOOD TYPING SEROLOGIC ABO: CPT

## 2023-07-31 PROCEDURE — 85025 COMPLETE CBC W/AUTO DIFF WBC: CPT

## 2023-07-31 PROCEDURE — 86901 BLOOD TYPING SEROLOGIC RH(D): CPT

## 2023-07-31 PROCEDURE — 88342 IMHCHEM/IMCYTCHM 1ST ANTB: CPT

## 2023-07-31 PROCEDURE — P9016 RBC LEUKOCYTES REDUCED: HCPCS

## 2023-07-31 PROCEDURE — 84702 CHORIONIC GONADOTROPIN TEST: CPT

## 2023-07-31 PROCEDURE — 80053 COMPREHEN METABOLIC PANEL: CPT

## 2023-07-31 PROCEDURE — 86923 COMPATIBILITY TEST ELECTRIC: CPT

## 2023-07-31 PROCEDURE — 86850 RBC ANTIBODY SCREEN: CPT

## 2023-07-31 PROCEDURE — 99285 EMERGENCY DEPT VISIT HI MDM: CPT

## 2023-07-31 PROCEDURE — 88305 TISSUE EXAM BY PATHOLOGIST: CPT

## 2023-07-31 RX ORDER — SODIUM CHLORIDE 9 MG/ML
INJECTION, SOLUTION INTRAVENOUS PRN
Status: DISCONTINUED | OUTPATIENT
Start: 2023-07-31 | End: 2023-08-01 | Stop reason: HOSPADM

## 2023-07-31 RX ORDER — 0.9 % SODIUM CHLORIDE 0.9 %
1000 INTRAVENOUS SOLUTION INTRAVENOUS ONCE
Status: COMPLETED | OUTPATIENT
Start: 2023-07-31 | End: 2023-08-01

## 2023-07-31 ASSESSMENT — PAIN - FUNCTIONAL ASSESSMENT: PAIN_FUNCTIONAL_ASSESSMENT: NONE - DENIES PAIN

## 2023-08-01 ENCOUNTER — HOSPITAL ENCOUNTER (INPATIENT)
Age: 43
LOS: 1 days | Discharge: HOME | DRG: 564 | End: 2023-08-02
Attending: EMERGENCY MEDICINE | Admitting: STUDENT IN AN ORGANIZED HEALTH CARE EDUCATION/TRAINING PROGRAM
Payer: MEDICAID

## 2023-08-01 ENCOUNTER — ANCILLARY PROCEDURE (OUTPATIENT)
Dept: EMERGENCY DEPT | Age: 43
End: 2023-08-01
Attending: STUDENT IN AN ORGANIZED HEALTH CARE EDUCATION/TRAINING PROGRAM
Payer: MEDICAID

## 2023-08-01 ENCOUNTER — APPOINTMENT (OUTPATIENT)
Dept: ULTRASOUND IMAGING | Age: 43
DRG: 564 | End: 2023-08-01
Payer: MEDICAID

## 2023-08-01 VITALS
SYSTOLIC BLOOD PRESSURE: 117 MMHG | DIASTOLIC BLOOD PRESSURE: 96 MMHG | OXYGEN SATURATION: 93 % | BODY MASS INDEX: 32.12 KG/M2 | HEART RATE: 127 BPM | TEMPERATURE: 98.5 F | HEIGHT: 61 IN | RESPIRATION RATE: 17 BRPM

## 2023-08-01 DIAGNOSIS — O03.9 MISCARRIAGE: Primary | ICD-10-CM

## 2023-08-01 DIAGNOSIS — R57.8 HEMORRHAGIC SHOCK (HCC): ICD-10-CM

## 2023-08-01 PROBLEM — N93.9 VAGINAL BLEEDING: Status: ACTIVE | Noted: 2023-08-01

## 2023-08-01 LAB
ABO + RH BLD: NORMAL
ABO + RH BLD: NORMAL
BLD GP AB SCN SERPL QL: NORMAL
BLD GP AB SCN SERPL QL: NORMAL
BLOOD BANK DISPENSE STATUS: NORMAL
BLOOD BANK DISPENSE STATUS: NORMAL
BLOOD BANK PRODUCT CODE: NORMAL
BLOOD BANK PRODUCT CODE: NORMAL
BPU ID: NORMAL
BPU ID: NORMAL
DESCRIPTION BLOOD BANK: NORMAL
DESCRIPTION BLOOD BANK: NORMAL
HCT VFR BLD AUTO: 28.5 % (ref 36–48)
HCT VFR BLD AUTO: 33.6 % (ref 36–48)
HCT VFR BLD AUTO: 35.2 % (ref 36–48)
HGB BLD-MCNC: 11.6 G/DL (ref 12–16)
HGB BLD-MCNC: 12.1 G/DL (ref 12–16)
HGB BLD-MCNC: 9.8 G/DL (ref 12–16)
INR PPP: 1.05 (ref 0.84–1.16)
PROTHROMBIN TIME: 13.7 SEC (ref 11.5–14.8)
RHIG LOT NUMBER: NORMAL

## 2023-08-01 PROCEDURE — 96374 THER/PROPH/DIAG INJ IV PUSH: CPT

## 2023-08-01 PROCEDURE — 85014 HEMATOCRIT: CPT

## 2023-08-01 PROCEDURE — 6360000002 HC RX W HCPCS: Performed by: OBSTETRICS & GYNECOLOGY

## 2023-08-01 PROCEDURE — P9016 RBC LEUKOCYTES REDUCED: HCPCS

## 2023-08-01 PROCEDURE — P9017 PLASMA 1 DONOR FRZ W/IN 8 HR: HCPCS

## 2023-08-01 PROCEDURE — 6360000002 HC RX W HCPCS: Performed by: EMERGENCY MEDICINE

## 2023-08-01 PROCEDURE — 96372 THER/PROPH/DIAG INJ SC/IM: CPT

## 2023-08-01 PROCEDURE — 2000000000 HC ICU R&B

## 2023-08-01 PROCEDURE — 30233N1 TRANSFUSION OF NONAUTOLOGOUS RED BLOOD CELLS INTO PERIPHERAL VEIN, PERCUTANEOUS APPROACH: ICD-10-PCS | Performed by: EMERGENCY MEDICINE

## 2023-08-01 PROCEDURE — 6370000000 HC RX 637 (ALT 250 FOR IP): Performed by: OBSTETRICS & GYNECOLOGY

## 2023-08-01 PROCEDURE — 2580000003 HC RX 258: Performed by: OBSTETRICS & GYNECOLOGY

## 2023-08-01 PROCEDURE — 85018 HEMOGLOBIN: CPT

## 2023-08-01 PROCEDURE — 86900 BLOOD TYPING SEROLOGIC ABO: CPT

## 2023-08-01 PROCEDURE — 36430 TRANSFUSION BLD/BLD COMPNT: CPT

## 2023-08-01 PROCEDURE — 6370000000 HC RX 637 (ALT 250 FOR IP): Performed by: STUDENT IN AN ORGANIZED HEALTH CARE EDUCATION/TRAINING PROGRAM

## 2023-08-01 PROCEDURE — 36415 COLL VENOUS BLD VENIPUNCTURE: CPT

## 2023-08-01 PROCEDURE — 99285 EMERGENCY DEPT VISIT HI MDM: CPT

## 2023-08-01 PROCEDURE — 86923 COMPATIBILITY TEST ELECTRIC: CPT

## 2023-08-01 PROCEDURE — 2580000003 HC RX 258: Performed by: STUDENT IN AN ORGANIZED HEALTH CARE EDUCATION/TRAINING PROGRAM

## 2023-08-01 PROCEDURE — 86850 RBC ANTIBODY SCREEN: CPT

## 2023-08-01 PROCEDURE — 76815 OB US LIMITED FETUS(S): CPT

## 2023-08-01 PROCEDURE — 86901 BLOOD TYPING SEROLOGIC RH(D): CPT

## 2023-08-01 PROCEDURE — 2500000003 HC RX 250 WO HCPCS: Performed by: EMERGENCY MEDICINE

## 2023-08-01 PROCEDURE — 76817 TRANSVAGINAL US OBSTETRIC: CPT

## 2023-08-01 PROCEDURE — 85610 PROTHROMBIN TIME: CPT

## 2023-08-01 RX ORDER — METHYLERGONOVINE MALEATE 0.2 MG/ML
200 INJECTION INTRAVENOUS ONCE
Status: COMPLETED | OUTPATIENT
Start: 2023-08-01 | End: 2023-08-01

## 2023-08-01 RX ORDER — SODIUM CHLORIDE, SODIUM LACTATE, POTASSIUM CHLORIDE, CALCIUM CHLORIDE 600; 310; 30; 20 MG/100ML; MG/100ML; MG/100ML; MG/100ML
1000 INJECTION, SOLUTION INTRAVENOUS CONTINUOUS
Status: DISCONTINUED | OUTPATIENT
Start: 2023-08-01 | End: 2023-08-02 | Stop reason: HOSPADM

## 2023-08-01 RX ORDER — ONDANSETRON 2 MG/ML
4 INJECTION INTRAMUSCULAR; INTRAVENOUS EVERY 6 HOURS PRN
Status: DISCONTINUED | OUTPATIENT
Start: 2023-08-01 | End: 2023-08-02 | Stop reason: HOSPADM

## 2023-08-01 RX ORDER — METRONIDAZOLE 500 MG/100ML
500 INJECTION, SOLUTION INTRAVENOUS EVERY 8 HOURS
Status: DISCONTINUED | OUTPATIENT
Start: 2023-08-01 | End: 2023-08-02 | Stop reason: HOSPADM

## 2023-08-01 RX ORDER — POLYETHYLENE GLYCOL 3350 17 G/17G
17 POWDER, FOR SOLUTION ORAL DAILY PRN
Status: DISCONTINUED | OUTPATIENT
Start: 2023-08-01 | End: 2023-08-02 | Stop reason: HOSPADM

## 2023-08-01 RX ORDER — SODIUM CHLORIDE 0.9 % (FLUSH) 0.9 %
5-40 SYRINGE (ML) INJECTION EVERY 12 HOURS SCHEDULED
Status: DISCONTINUED | OUTPATIENT
Start: 2023-08-01 | End: 2023-08-02 | Stop reason: HOSPADM

## 2023-08-01 RX ORDER — ACETAMINOPHEN 325 MG/1
650 TABLET ORAL EVERY 6 HOURS PRN
Status: DISCONTINUED | OUTPATIENT
Start: 2023-08-01 | End: 2023-08-02 | Stop reason: HOSPADM

## 2023-08-01 RX ORDER — MAGNESIUM SULFATE IN WATER 40 MG/ML
2000 INJECTION, SOLUTION INTRAVENOUS PRN
Status: DISCONTINUED | OUTPATIENT
Start: 2023-08-01 | End: 2023-08-02 | Stop reason: HOSPADM

## 2023-08-01 RX ORDER — CLINDAMYCIN PHOSPHATE 900 MG/50ML
900 INJECTION INTRAVENOUS EVERY 8 HOURS
Status: DISCONTINUED | OUTPATIENT
Start: 2023-08-01 | End: 2023-08-02 | Stop reason: HOSPADM

## 2023-08-01 RX ORDER — ACETAMINOPHEN 650 MG/1
650 SUPPOSITORY RECTAL EVERY 6 HOURS PRN
Status: DISCONTINUED | OUTPATIENT
Start: 2023-08-01 | End: 2023-08-02 | Stop reason: HOSPADM

## 2023-08-01 RX ORDER — SODIUM CHLORIDE 9 MG/ML
INJECTION, SOLUTION INTRAVENOUS PRN
Status: DISCONTINUED | OUTPATIENT
Start: 2023-08-01 | End: 2023-08-02 | Stop reason: HOSPADM

## 2023-08-01 RX ORDER — SODIUM CHLORIDE 0.9 % (FLUSH) 0.9 %
5-40 SYRINGE (ML) INJECTION PRN
Status: DISCONTINUED | OUTPATIENT
Start: 2023-08-01 | End: 2023-08-02 | Stop reason: HOSPADM

## 2023-08-01 RX ORDER — KETAMINE HYDROCHLORIDE 100 MG/ML
30 INJECTION INTRAMUSCULAR; INTRAVENOUS ONCE
Status: COMPLETED | OUTPATIENT
Start: 2023-08-01 | End: 2023-08-01

## 2023-08-01 RX ORDER — ONDANSETRON 4 MG/1
4 TABLET, ORALLY DISINTEGRATING ORAL EVERY 8 HOURS PRN
Status: DISCONTINUED | OUTPATIENT
Start: 2023-08-01 | End: 2023-08-02 | Stop reason: HOSPADM

## 2023-08-01 RX ORDER — METHYLERGONOVINE MALEATE 0.2 MG/1
200 TABLET ORAL EVERY 6 HOURS SCHEDULED
Status: COMPLETED | OUTPATIENT
Start: 2023-08-01 | End: 2023-08-01

## 2023-08-01 RX ORDER — POTASSIUM CHLORIDE 7.45 MG/ML
10 INJECTION INTRAVENOUS PRN
Status: DISCONTINUED | OUTPATIENT
Start: 2023-08-01 | End: 2023-08-02 | Stop reason: HOSPADM

## 2023-08-01 RX ADMIN — CLINDAMYCIN IN 5 PERCENT DEXTROSE 900 MG: 18 INJECTION, SOLUTION INTRAVENOUS at 15:39

## 2023-08-01 RX ADMIN — OXYTOCIN 87.3 MILLI-UNITS/MIN: 10 INJECTION, SOLUTION INTRAMUSCULAR; INTRAVENOUS at 08:39

## 2023-08-01 RX ADMIN — SODIUM CHLORIDE, POTASSIUM CHLORIDE, SODIUM LACTATE AND CALCIUM CHLORIDE 1000 ML: 600; 310; 30; 20 INJECTION, SOLUTION INTRAVENOUS at 06:27

## 2023-08-01 RX ADMIN — METRONIDAZOLE 500 MG: 500 INJECTION, SOLUTION INTRAVENOUS at 09:39

## 2023-08-01 RX ADMIN — HUMAN RHO(D) IMMUNE GLOBULIN 300 MCG: 300 INJECTION, SOLUTION INTRAMUSCULAR at 03:00

## 2023-08-01 RX ADMIN — METHYLERGONOVINE MALEATE 200 MCG: 0.2 TABLET ORAL at 23:10

## 2023-08-01 RX ADMIN — SODIUM CHLORIDE 1000 ML: 9 INJECTION, SOLUTION INTRAVENOUS at 01:39

## 2023-08-01 RX ADMIN — ACETAMINOPHEN 650 MG: 325 TABLET ORAL at 09:44

## 2023-08-01 RX ADMIN — METHYLERGONOVINE MALEATE 200 MCG: 0.2 TABLET ORAL at 10:37

## 2023-08-01 RX ADMIN — CLINDAMYCIN IN 5 PERCENT DEXTROSE 900 MG: 18 INJECTION, SOLUTION INTRAVENOUS at 23:16

## 2023-08-01 RX ADMIN — METHYLERGONOVINE MALEATE 200 MCG: 0.2 TABLET ORAL at 17:39

## 2023-08-01 RX ADMIN — CLINDAMYCIN IN 5 PERCENT DEXTROSE 900 MG: 18 INJECTION, SOLUTION INTRAVENOUS at 08:41

## 2023-08-01 RX ADMIN — Medication 166.7 ML: at 08:25

## 2023-08-01 RX ADMIN — ACETAMINOPHEN 650 MG: 325 TABLET ORAL at 23:13

## 2023-08-01 RX ADMIN — KETAMINE HYDROCHLORIDE 30 MG: 100 INJECTION INTRAMUSCULAR; INTRAVENOUS at 02:55

## 2023-08-01 RX ADMIN — METRONIDAZOLE 500 MG: 500 INJECTION, SOLUTION INTRAVENOUS at 16:37

## 2023-08-01 RX ADMIN — ACETAMINOPHEN 650 MG: 325 TABLET ORAL at 17:46

## 2023-08-01 RX ADMIN — METHYLERGONOVINE MALEATE 200 MCG: 0.2 INJECTION INTRAVENOUS at 04:04

## 2023-08-01 ASSESSMENT — PAIN DESCRIPTION - LOCATION
LOCATION: BACK
LOCATION: BACK

## 2023-08-01 ASSESSMENT — PAIN SCALES - GENERAL
PAINLEVEL_OUTOF10: 3
PAINLEVEL_OUTOF10: 5

## 2023-08-01 ASSESSMENT — LIFESTYLE VARIABLES
HOW MANY STANDARD DRINKS CONTAINING ALCOHOL DO YOU HAVE ON A TYPICAL DAY: PATIENT DOES NOT DRINK
HOW OFTEN DO YOU HAVE A DRINK CONTAINING ALCOHOL: NEVER
HOW MANY STANDARD DRINKS CONTAINING ALCOHOL DO YOU HAVE ON A TYPICAL DAY: PATIENT DOES NOT DRINK
HOW OFTEN DO YOU HAVE A DRINK CONTAINING ALCOHOL: NEVER

## 2023-08-01 ASSESSMENT — PAIN SCALES - WONG BAKER: WONGBAKER_NUMERICALRESPONSE: 0

## 2023-08-01 NOTE — CONSENT
Informed Consent for Blood Component Transfusion Note    I have discussed with the patient the rationale for blood component transfusion; its benefits in treating or preventing fatigue, organ damage, or death; and its risk which includes mild transfusion reactions, rare risk of blood borne infection, or more serious but rare reactions. I have discussed the alternatives to transfusion, including the risk and consequences of not receiving transfusion. The patient had an opportunity to ask questions and had agreed to proceed with transfusion of blood components.     Electronically signed by Jovanna Hernandez MD on 8/1/23 at 2:12 AM EDT

## 2023-08-01 NOTE — PROGRESS NOTES
CTSP by ER nurse reporting patient was having episodes of low blood pressure. RN reports patient was c/o feeling pressure & pain, had a bowel movement and some clots passed. Pt had received IM Methergine approximately 1 hour prior to BM. The ER physician had just ordered 1 U of PRBC and 1 U of FFP. Upon my arrival to the room, patient was complaining of cramping and asking for some pain medication. She stated she was tired. The patient consented to have a speculum exam done to further evaluate her bleeding. Thus far, patient has received a total of 4 U PRBC, one dose of tranexamic acid, and 200 mcg of Methergine IM  O:  Vitals:    08/01/23 0552 08/01/23 0557 08/01/23 0614 08/01/23 0617   BP: 85/68 85/76 127/66 113/67   Pulse: (!) 103 93 94 87   Resp: 23 23 19 13   Temp:       TempSrc:       SpO2:  94% 100% 100%   Weight:   220 lb 7.4 oz (100 kg)    Height:   5' 0.98\" (1.549 m)         Chaperone - ER - Shantanu Bryan RN  Using sterile technique, SSE placed w/o difficulty  Cx 1 cm - no active bleeding at os, small dime size clot removed   Vagina - half dollar size clot removed from posterior fornix of vagina  Pelvic exam-limited by body habitus  Granger catheter was placed by RN using sterile technique  200 ml concentrated urine obtained    A/P: 42 y/o w/ hemorrhage following SAB  - D/W pt recommendation to proceed to OR for a suction D&C procedure- explained possibility of her having continued uterine bleeding which will worsen her condition. The procedure R/B/A/expected outcomes d/w pt. & questions were answered. Pt declined surgery at this time. Pt stated she wanted to wait & see what happens w/ blood transfusion and current interventions. Shantanu Bryan RN was present for this conversation. - LR Bolus 500 ml ordered, then run at 125 ml/hr. given, currently 1 U PRBC &   1 U FFP are running  -pitocin was ordered   -vital signs improving following above interventions - will continue to monitor for now  -Ancef

## 2023-08-01 NOTE — ED NOTES
Call placed to [facility] thru answering service.      Sabas Garcia, EMT-P  07/31/23 4204

## 2023-08-01 NOTE — ED NOTES
PT continues to have hypotension and bleeding. V.O. from Dr. Wang Lacks to check for [address]. [facility] accepted and enroute, 26 min ETA. Laura Molina clinical notified.      Brittany Coker, EMT-P  08/01/23 7584

## 2023-08-01 NOTE — ED NOTES
Dr. Kassandra Sharma spoke with Dr. Jailyn Corderos at [facility] for transfer. [facility] - on long distance run. N/A rest of the night.   Esther Alfaro - Already on a run  [facility] - accepted run       Lucila Holley, EMT-P  08/01/23 0792

## 2023-08-01 NOTE — ED NOTES
Emergency blood unit started, Vitals 119/78 temp 98.6 oral, pulse 508 Casie Cameron RN  08/01/23 0151       Carolann Reynoso, DEANNA  08/01/23 0604

## 2023-08-01 NOTE — PROGRESS NOTES
Vaginal Bleeding has slowed, losing approx 10 ml per hour. Complains of pain from christy so I removed it.

## 2023-08-01 NOTE — ED PROVIDER NOTES
0.0 - 0.2 K/uL   CMP w/ Reflex to MG   Result Value Ref Range    Sodium 139 136 - 145 mmol/L    Potassium reflex Magnesium 3.9 3.5 - 5.1 mmol/L    Chloride 104 99 - 110 mmol/L    CO2 20 (L) 21 - 32 mmol/L    Anion Gap 15 3 - 16    Glucose 173 (H) 70 - 99 mg/dL    BUN 6 (L) 7 - 20 mg/dL    Creatinine 0.7 0.6 - 1.1 mg/dL    Est, Glom Filt Rate >60 >60    Calcium 9.1 8.3 - 10.6 mg/dL    Total Protein 6.1 (L) 6.4 - 8.2 g/dL    Albumin 3.8 3.4 - 5.0 g/dL    Albumin/Globulin Ratio 1.7 1.1 - 2.2    Total Bilirubin <0.2 0.0 - 1.0 mg/dL    Alkaline Phosphatase 88 40 - 129 U/L    ALT 23 10 - 40 U/L    AST 19 15 - 37 U/L   HCG, Quantitative, Pregnancy   Result Value Ref Range    hCG Quant 2396.0 <5.0 mIU/mL         RADIOLOGY    US ED PREG UTERUS LTD 1 OR MORE FETUSES   Final Result      US OB 1 OR MORE FETUS LIMITED    (Results Pending)       Bedside Ultrasound, as interpreted by me, if performed:    US ED PREG UTERUS LTD 1 OR MORE FETUSES    Result Date: 8/1/2023  POCUS_OB_TAUS     Exam Information:          Exam type:  Clinically indicated     Indication(s) for Exam:          The exam was performed with the following indications[de-identified]  Pregnancy, Vaginal bleeding     Views Obtained & Images Saved for These Views: The following structures were examined from a transabdominal approach[de-identified]          Uterus and pouch of Dallin         Uterus transverse axis     Findings:          Definitive intra-uterine pregnancy?:  Indeterminate         Free fluid in cul-de-sac[de-identified]  Absent     Interpretation:          No definitive IUP     Confirmatory study:          What confirmatory study was done?:  Not applicable         Confirmatory study findings[de-identified]  awaiting transfer for comprehensive US  Electronically signed by Kaleigh Rodriguez on Tuesday, August 1, 2023 at 1:05 AM : Kaleigh Rodriguez Attending: Dulce Maria January, [address] and DIFFERENTIAL DIAGNOSIS/MDM:   Patient seen and evaluated.  Old records reviewed and pertinent

## 2023-08-01 NOTE — CONSULTS
Department of Gynecology  Attending Consult Note      Reason for Consult:  vaginal bleeding  Requesting Physician:  ER provider    CHIEF COMPLAINT:   vaginal bleeding in first trimester of pregnancy    History obtained from patient    HISTORY OF PRESENT ILLNESS:                   The patient is a 41 y.o. [id number] @ 11 wks. By [facility] of 23 presents to [facility] ER via [address] from [facility] ER. Pt was seen for vaginal bleeding which started around 9 pm on 23 - pt reports \"it was like a faucet broke. \" Pt had fiance bring her to [facility] ER immediately. Reports had some spotting following returning from vacation yesterday and earlier today and then heavy bleeding started at 9 pm on 23. Pt denies pain. Pt was given 1 U PRBC b/n [facility] ER. She also received 1 U PRBC and tranexamic acid in [facility] in route to [facility]. Upon arrival to [facility] ED, she received 2 U PRBC and a right venous central line was placed by the [facility] ER provider-Dr. Owen Singh. I performed a pelvic exam and the ultrasonographer performed a STAT US. Pt was awake and cooperative throughout her evaluation and treatment in the ER. She consented to being treated at [facility] ER, for a pelvic exam and a pelvic US including a transvaginal ultrasound. Pt reports no passage of tissue, only had bleeding. Denies SOB, palpitations, HA, vision changes, N/V/D/C, RUQ or shoulder pain. Denies urinary complaints. Past Medical History:        Diagnosis Date    HPV in female     Hx of migraines      contractions     was on PO Brethine. Stopped at 36 wks    Smoker      Past Surgical History:        Procedure Laterality Date    CHOLECYSTECTOMY      TONSILLECTOMY      TYMPANOSTOMY TUBE PLACEMENT         Past Gynecological History:    1. Last menstrual period:  23  2. Menses: interval:  4 weeks  3. Contraception: None  4. Sexually transmitted disease history: none        A. Number of sexual partners in the last 6 months: 1    5.  Pap History: hx of abnormal pap correlating to  hemorrhage in a patient with vaginal bleeding. There is also fluid within  the endocervical canal with cervix appearing open. Yolk Sac:  No     Fetal Pole:  No     Crown Rump Length:  Not measured     Fetal Heart Rate:  Not measured     Right ovary: 2.4 x 1.6 x 2.3 cm with normal arterial and venous Doppler flow. Left ovary: 3.0 x 1.9 x 1.7 cm with normal arterial and venous Doppler flow. Free fluid: No     IMPRESSION:  1. No evidence of intrauterine pregnancy. 2. Heterogeneous thickening of the endometrium with fluid in the endocervical  canal and open cervix. Spectrum of findings may represent miscarriage. Recommend Ob follow-up and serial beta HCG levels. 3. Normal appearance of the ovaries and adnexa. Specimen Collected: 23 02:56 EDT Last Resulted: 23 03:00 EDT             IMPRESSION/RECOMMENDATIONS:      Active Problems:   Vaginal bleeding in first trimester/Spontaneous   -D/W pt d/dx of VB in early preg including but not limited to ectopic pregnancy, miscarriage, and bleeding in early pregnancy. Explained above presentation, labs, and US findings are most c/w a miscarriage which is also known as a spontaneous . Explained to pt she had a significant blood loss prior to arrival to [facility] and recommendation is for an admission to the ICU for evaluation. Also d/w pt. possibility of having to go to OR for Suction D&C with any further significant bleeding. For now, will start Methergine  mcg now & then switch to oral methergine for 24 hours of treatment. Questions were answered - patient & fiance stated understanding and were in agreement with the plan.   -appreciate consult & will continue to follow patient during hospital admission  -Rh negative - s/p rhogam injection  -Massive hemorrhage - will admit to ICU for further evaluation & management- ER provider  will contact hospitalist

## 2023-08-01 NOTE — ED TRIAGE NOTES
11 weeks pregnant. Started with bright red bleeding tonight around 2200. Been cramping over the weekend. Patient states light pink bleeding earlier today and bleeding started this evening. Patient states she has a ob appointment tomorrow.

## 2023-08-01 NOTE — CARE COORDINATION
Case Management Assessment  Initial Evaluation    Date/Time of Evaluation: 8/1/2023 10:42 AM  Assessment Completed by: Charlie Menendez RN    If patient is discharged prior to next notation, then this note serves as note for discharge by case management. Patient Name: Genet Lombardo                   YOB: 1980  Diagnosis: Hemorrhagic shock ([address]) [R57.8]  Miscarriage [O03.9]  Vaginal bleeding [N93.9]                   Date / Time: 8/1/2023  1:48 AM    Patient Admission Status: Inpatient   Readmission Risk (Low < 19, Mod (19-27), High > 27): Readmission Risk Score: 7.5    Current PCP: BARB Faust CNP  PCP verified by CM? Yes    Chart Reviewed: Yes      History Provided by: Patient  Patient Orientation: Alert and Oriented    Patient Cognition: Alert    Hospitalization in the last 30 days (Readmission):  No    If yes, Readmission Assessment in CM Navigator will be completed. Advance Directives:      Code Status: Full Code   Patient's Primary Decision Maker is: Legal Next of Kin      Discharge Planning:    Patient lives with: Children Type of Home: House  Primary Care Giver: Self  Patient Support Systems include: Children   Current Financial resources: Medicaid  Current community resources: None  Current services prior to admission: None            Current DME:              Type of Home Care services:  None    ADLS  Prior functional level: Independent in ADLs/IADLs  Current functional level: Assistance with the following:, Bathing, Dressing, Toileting, Mobility    PT AM-PAC:   /24  OT AM-PAC:   /24    Family can provide assistance at DC: Yes  Would you like Case Management to discuss the discharge plan with any other family members/significant others, and if so, who?  No  Plans to Return to Present Housing: Yes  Other Identified Issues/Barriers to RETURNING to current housing: Post op  Potential Assistance needed at discharge: N/A            Potential DME:    Patient expects to discharge to: House  Plan for transportation at discharge: Family    Financial    Payor: [address] [place] [address] / Plan: Jorge Youngblood / Product Type: *No Product type* /     Does insurance require precert for SNF: Yes    Potential assistance Purchasing Medications: No  M[facility]-Beds request: Yes      [facility] [address] [facility]  [address] [address] [facility] [address] [facility]  Phone: [phone] Fax: [phone]      Notes:    Factors facilitating achievement of predicted outcomes: Family support    Barriers to discharge: Stairs at home    Additional Case Management Notes: Pt is from bilevel home w children. IPTA- no DME or services. Denies needs. The Plan for Transition of Care is related to the following treatment goals of Hemorrhagic shock ([address]) [R57.8]  Miscarriage [O03.9]  Vaginal bleeding [K01.1]    IF APPLICABLE: The Patient and/or patient representative Jolly Fuentes and her family were provided with a choice of provider and agrees with the discharge plan. Freedom of choice list with basic dialogue that supports the patient's individualized plan of care/goals and shares the quality data associated with the providers was provided to:  (na)   Patient Representative Name:       The Patient and/or Patient Representative Agree with the Discharge Plan?       Tin Brady RN  Case Management Department  Ph: [phone] Fax: [phone]

## 2023-08-01 NOTE — PROGRESS NOTES
Patient was seen by one of my partners this morning and admitted to ICU    EMR reviewed history and physical reviewed  Patient was seen and examined    Currently vital stable ,no profuse bleeding from vagina  No dizziness chest pain shortness of breath or change in mental status  No abdominal cramps  Tolerating liquid diet    Vital stable    Hemoglobin stable    S/p 5 units PRBC and 1 FFP  On IV fluids    OB input appreciated and  following  On clinda, Flagyl  On Methergine  S/p RhoGAM

## 2023-08-01 NOTE — H&P
History and Physical      Name:  Anusha Durant /Age/Sex: 1980  (41 y.o. female)   MRN & CSN:  [id number] & [id number] Encounter Date/Time: 2023 3:40 AM EDT   Location:  10/10 PCP: Ang Chinchilla [address] [place] [address]  Day: 1    Assessment and Plan:     Patient is a 77-year-old female who presented with vaginal bleeding. # Hemorrhagic shock secondary to first trimester miscarriage  - Endorsed having sudden large-amount of vaginal bleeding evening .   - Transferred from [facility] ED after receiving 2 units pRBCs. Also received 2 additional units in ED on arrival. US without ectopic pregnancy or intra-abdominal blood. - Evaluated by OBGYN in ED, Methergine and TXA given. - Follow-up repeat H/H following transfusion, keep Hg >7. Will also give 1 unit of plasma and 1 unit platelets. Checklist:  Advanced directive: full  Diet: NPO for now  DVT ppx: SCD    Disposition: admit to inpatient. Estimated discharge: 3-5 day(s). Current living situation: home. Expected disposition: home. Spoke with ED provider who recommended admission for the patient and I agree with that plan. Personally reviewed lab studies and imaging. EKG interpreted personally and results as stated above. Imaging that was interpreted personally and results as stated above. History of Present Illness:     Chief Complaint: vaginal bleeding    Patient is a 77-year-old female with no known PMHx who presented to the ED with sudden vaginal bleeding and lower abdominal cramping at 2100 . Last MP mid 2023. Denied any other source of bleeding. No fevers, chills, SOB, CP, N/V, abdominal pain or urinary changes. Transferred from [facility] ED after receiving 2 units pRBCs. Also received 2 additional units in ED on arrival.    History obtained from: patient and ED provider. ROS:     Pertinent positives and negatives discussed in HPI above.     Objective:     No intake or output data in the 24 hours ending 23 0340     Vitals:   Vitals:    23 0257 23 0301 23 0307 23 0322   BP: (!) 118/92 (!) 130/104 (!) 127/95 (!) 115/90   Pulse: (!) 102 (!) 118 (!) 117 (!) 118   Resp: 21 24 15 16   Temp:       TempSrc:       SpO2:  99% 99% 99%     BMI: There is no height or weight on file to calculate BMI. General: Awake. HEENT: PERRLA. Vision grossly intact. Hearing grossly intact. Oropharynx clear. Neck: Supple. No JVD. CV: Tachycardia. NL S1/S2. No murmurs. CR <2 secs. No BLE edema. Pulm: NL effort on RA. CTAB. GI: Soft. NT/ND. : No CVA tenderness. No Granger catheter. Pelvix exam deferred to OBGYN. Skin: Intact, warm and dry. Pale. MSK: No gross joint deformities. Full ROM. Neuro: AAOx3. CNs grossly intact. Normal speech. No focal deficit. Psych: Good judgement and reason. Past History: PMHx:   Past Medical History:   Diagnosis Date    HPV in female     Hx of migraines      contractions     was on PO Brethine. Stopped at 36 wks    Smoker        PSHx:   Past Surgical History:   Procedure Laterality Date    CHOLECYSTECTOMY      TONSILLECTOMY      TYMPANOSTOMY TUBE PLACEMENT         Allergies: Allergies   Allergen Reactions    Amoxicillin Hives    Codeine Hives    Erythrocin Hives    Lorabid [Loracarbef] Hives    Penicillins Hives    Sulfa Antibiotics Hives       FHx: family history includes Arthritis in her mother; Cancer in her maternal grandfather; High Blood Pressure in her maternal grandmother; Stroke in her maternal grandmother. SHx:   Social History     Socioeconomic History    Marital status:      Spouse name: None    Number of children: None    Years of education: None    Highest education level: None   Tobacco Use    Smoking status: Some Days     Packs/day: 0.50     Years: 5.00     Pack years: 2.50     Types: Cigarettes    Smokeless tobacco: Never   Vaping Use    Vaping Use: Never used   Substance and Sexual Activity    Alcohol use:  Yes

## 2023-08-02 VITALS
RESPIRATION RATE: 20 BRPM | DIASTOLIC BLOOD PRESSURE: 66 MMHG | HEIGHT: 61 IN | TEMPERATURE: 98.5 F | BODY MASS INDEX: 41.75 KG/M2 | SYSTOLIC BLOOD PRESSURE: 104 MMHG | WEIGHT: 221.12 LBS | HEART RATE: 93 BPM | OXYGEN SATURATION: 98 %

## 2023-08-02 LAB
ANION GAP SERPL CALCULATED.3IONS-SCNC: 10 MMOL/L (ref 3–16)
B-HCG SERPL EIA 3RD IS-ACNC: 872.1 MIU/ML
BASOPHILS # BLD: 0 K/UL (ref 0–0.2)
BASOPHILS NFR BLD: 0.2 %
BLOOD BANK DISPENSE STATUS: NORMAL
BLOOD BANK PRODUCT CODE: NORMAL
BPU ID: NORMAL
BUN SERPL-MCNC: 4 MG/DL (ref 7–20)
CALCIUM SERPL-MCNC: 8.1 MG/DL (ref 8.3–10.6)
CHLORIDE SERPL-SCNC: 111 MMOL/L (ref 99–110)
CO2 SERPL-SCNC: 20 MMOL/L (ref 21–32)
CREAT SERPL-MCNC: 0.6 MG/DL (ref 0.6–1.1)
DEPRECATED RDW RBC AUTO: 15.3 % (ref 12.4–15.4)
DESCRIPTION BLOOD BANK: NORMAL
EOSINOPHIL # BLD: 0.1 K/UL (ref 0–0.6)
EOSINOPHIL NFR BLD: 1.3 %
GFR SERPLBLD CREATININE-BSD FMLA CKD-EPI: >60 ML/MIN/{1.73_M2}
GLUCOSE SERPL-MCNC: 145 MG/DL (ref 70–99)
HCT VFR BLD AUTO: 24.9 % (ref 36–48)
HCT VFR BLD AUTO: 27.3 % (ref 36–48)
HGB BLD-MCNC: 8.9 G/DL (ref 12–16)
HGB BLD-MCNC: 9.4 G/DL (ref 12–16)
INR PPP: 1.06 (ref 0.84–1.16)
LYMPHOCYTES # BLD: 3 K/UL (ref 1–5.1)
LYMPHOCYTES NFR BLD: 32.8 %
MCH RBC QN AUTO: 31.3 PG (ref 26–34)
MCHC RBC AUTO-ENTMCNC: 35.6 G/DL (ref 31–36)
MCV RBC AUTO: 87.9 FL (ref 80–100)
MONOCYTES # BLD: 0.5 K/UL (ref 0–1.3)
MONOCYTES NFR BLD: 5.1 %
NEUTROPHILS # BLD: 5.5 K/UL (ref 1.7–7.7)
NEUTROPHILS NFR BLD: 60.6 %
PLATELET # BLD AUTO: 140 K/UL (ref 135–450)
PMV BLD AUTO: 8.4 FL (ref 5–10.5)
POTASSIUM SERPL-SCNC: 3.9 MMOL/L (ref 3.5–5.1)
PROTHROMBIN TIME: 13.8 SEC (ref 11.5–14.8)
RBC # BLD AUTO: 2.83 M/UL (ref 4–5.2)
SODIUM SERPL-SCNC: 141 MMOL/L (ref 136–145)
WBC # BLD AUTO: 9.2 K/UL (ref 4–11)

## 2023-08-02 PROCEDURE — 36415 COLL VENOUS BLD VENIPUNCTURE: CPT

## 2023-08-02 PROCEDURE — 2580000003 HC RX 258: Performed by: OBSTETRICS & GYNECOLOGY

## 2023-08-02 PROCEDURE — 85025 COMPLETE CBC W/AUTO DIFF WBC: CPT

## 2023-08-02 PROCEDURE — 80048 BASIC METABOLIC PNL TOTAL CA: CPT

## 2023-08-02 PROCEDURE — 85610 PROTHROMBIN TIME: CPT

## 2023-08-02 PROCEDURE — 85018 HEMOGLOBIN: CPT

## 2023-08-02 PROCEDURE — 2580000003 HC RX 258: Performed by: STUDENT IN AN ORGANIZED HEALTH CARE EDUCATION/TRAINING PROGRAM

## 2023-08-02 PROCEDURE — 6360000002 HC RX W HCPCS: Performed by: OBSTETRICS & GYNECOLOGY

## 2023-08-02 PROCEDURE — 85014 HEMATOCRIT: CPT

## 2023-08-02 PROCEDURE — 84702 CHORIONIC GONADOTROPIN TEST: CPT

## 2023-08-02 RX ADMIN — METRONIDAZOLE 500 MG: 500 INJECTION, SOLUTION INTRAVENOUS at 01:56

## 2023-08-02 RX ADMIN — CLINDAMYCIN IN 5 PERCENT DEXTROSE 900 MG: 18 INJECTION, SOLUTION INTRAVENOUS at 08:34

## 2023-08-02 RX ADMIN — Medication 10 ML: at 08:39

## 2023-08-02 RX ADMIN — SODIUM CHLORIDE, POTASSIUM CHLORIDE, SODIUM LACTATE AND CALCIUM CHLORIDE 1000 ML: 600; 310; 30; 20 INJECTION, SOLUTION INTRAVENOUS at 02:48

## 2023-08-02 RX ADMIN — METRONIDAZOLE 500 MG: 500 INJECTION, SOLUTION INTRAVENOUS at 08:38

## 2023-08-02 NOTE — PLAN OF CARE
Problem: Discharge Planning  Goal: Discharge to home or other facility with appropriate resources  Outcome: Adequate for Discharge  Flowsheets (Taken 8/2/2023 0800)  Discharge to home or other facility with appropriate resources:   Identify barriers to discharge with patient and caregiver   Arrange for needed discharge resources and transportation as appropriate   Identify discharge learning needs (meds, wound care, etc)   Arrange for interpreters to assist at discharge as needed   Refer to discharge planning if patient needs post-hospital services based on physician order or complex needs related to functional status, cognitive ability or social support system     Problem: Safety - Adult  Goal: Free from fall injury  Outcome: Adequate for Discharge

## 2023-08-02 NOTE — PLAN OF CARE
Patient Seen in: [facility] AND [facility] Emergency Department      History   Patient presents with:  Swelling Edema    Stated Complaint: confirmed blood clot/ right leg     HPI    53-year-old male with history of diabetes, here with complaints of DVT to the ri Problem: Discharge Planning  Goal: Discharge to home or other facility with appropriate resources  Outcome: Progressing     Problem: Safety - Adult  Goal: Free from fall injury  Outcome: Progressing weakness, light-headedness and headaches. All other systems reviewed and are negative. Positive for stated complaint: confirmed blood clot/ right leg   Other systems are as noted in HPI. Constitutional and vital signs reviewed.       All other syste all extremities, normal finger to nose b/l, normal gait, no facial asymmetry, normal speech             ED Course     Pulse Oximeter:  Pulse oximetry on room air is 96%, indicating adequate oxygenation.     PROCEDURES:  none    DIAGNOSTICS:   Labs:  Recent Absolute 2.70 1.00 - 4.00 x10(3) uL    Monocyte Absolute 0.70 0.10 - 1.00 x10(3) uL    Eosinophil Absolute 0.28 0.00 - 0.70 x10(3) uL    Basophil Absolute 0.05 0.00 - 0.20 x10(3) uL    Immature Granulocyte Absolute 0.02 0.00 - 1.00 x10(3) uL    Neutrophil prior medical records for any recent pertinent discharge summaries, testing, and procedures, and reviewed those reports. Complicating Factors: The patient already has does not have any pertinent problems on file.  to contribute to the complexity of his E

## 2023-08-02 NOTE — PROGRESS NOTES
Patient cleared for discharge from [facility] and hospitalist. IV removed, discharge instructions reviewed. She verbalized understanding. Belongings packed. She was transported out via wheelchair.

## 2023-08-02 NOTE — CONSENT
Informed Consent for Blood Component Transfusion Note    I have discussed with the patient the rationale for blood component transfusion; its benefits in treating or preventing fatigue, organ damage, or death; and its risk which includes mild transfusion reactions, rare risk of blood borne infection, or more serious but rare reactions. I have discussed the alternatives to transfusion, including the risk and consequences of not receiving transfusion. The patient had an opportunity to ask questions and had agreed to proceed with transfusion of blood components.     Electronically signed by Madelin Redding MD on 8/1/23 at 10:57 PM EDT

## 2023-08-03 NOTE — PROGRESS NOTES
Physician Progress Note      PATIENT:               Sedrick Nails  CSN #:                  [id number]  :                       1980  ADMIT DATE:       2023 1:48 AM  DISCH DATE:        2023 4:19 PM  RESPONDING  PROVIDER #:        Bony Ga MD          QUERY TEXT:    Pt admitted with miscarriage with hemorrhage and shock. Pt noted to have HGB   8.9 and is s/p 5 U PRBC. If possible, please document in the progress notes   and discharge summary if you are evaluating and/or treating any of the   following: The medical record reflects the following:  Risk Factors: hemorrhage s/p miscarriage  Clinical Indicators: HGB 8.9, required 5 U PRBC  Treatment: blood administration, ultrasound, serial labs, supportive care    Thank you,  Andrew Strange RN, [email or web address]. [employer]  Options provided:  -- Acute blood loss anemia  -- Other - I will add my own diagnosis  -- Disagree - Not applicable / Not valid  -- Disagree - Clinically unable to determine / Unknown  -- Refer to Clinical Documentation Reviewer    PROVIDER RESPONSE TEXT:    This patient has acute blood loss anemia.     Query created by: Andrew Strange on 2023 3:16 PM      Electronically signed by:  Bony Ga MD 8/3/2023 2:08 PM

## 2023-08-04 LAB
BLOOD BANK DISPENSE STATUS: NORMAL
BLOOD BANK PRODUCT CODE: NORMAL
BPU ID: NORMAL
DESCRIPTION BLOOD BANK: NORMAL

## 2023-08-10 ENCOUNTER — HOSPITAL ENCOUNTER (EMERGENCY)
Age: 43
Discharge: HOME | End: 2023-08-10
Attending: STUDENT IN AN ORGANIZED HEALTH CARE EDUCATION/TRAINING PROGRAM
Payer: MEDICAID

## 2023-08-10 ENCOUNTER — ANESTHESIA EVENT (OUTPATIENT)
Dept: OPERATING ROOM | Age: 43
End: 2023-08-10
Payer: MEDICAID

## 2023-08-10 ENCOUNTER — APPOINTMENT (OUTPATIENT)
Dept: ULTRASOUND IMAGING | Age: 43
End: 2023-08-10
Payer: MEDICAID

## 2023-08-10 ENCOUNTER — ANESTHESIA (OUTPATIENT)
Dept: OPERATING ROOM | Age: 43
End: 2023-08-10
Payer: MEDICAID

## 2023-08-10 VITALS
OXYGEN SATURATION: 94 % | DIASTOLIC BLOOD PRESSURE: 72 MMHG | TEMPERATURE: 97.8 F | HEART RATE: 95 BPM | WEIGHT: 221 LBS | RESPIRATION RATE: 18 BRPM | BODY MASS INDEX: 43.39 KG/M2 | SYSTOLIC BLOOD PRESSURE: 107 MMHG | HEIGHT: 60 IN

## 2023-08-10 DIAGNOSIS — O03.9 MISCARRIAGE: Primary | ICD-10-CM

## 2023-08-10 DIAGNOSIS — O02.1 MISSED ABORTION: ICD-10-CM

## 2023-08-10 LAB
ABO + RH BLD: NORMAL
ALBUMIN SERPL-MCNC: 3.6 G/DL (ref 3.4–5)
ALBUMIN/GLOB SERPL: 1.4 {RATIO} (ref 1.1–2.2)
ALP SERPL-CCNC: 80 U/L (ref 40–129)
ALT SERPL-CCNC: 39 U/L (ref 10–40)
ANION GAP SERPL CALCULATED.3IONS-SCNC: 11 MMOL/L (ref 3–16)
AST SERPL-CCNC: 22 U/L (ref 15–37)
BASOPHILS # BLD: 0.1 K/UL (ref 0–0.2)
BASOPHILS NFR BLD: 0.8 %
BILIRUB SERPL-MCNC: <0.2 MG/DL (ref 0–1)
BLD GP AB SCN SERPL QL: NORMAL
BLOOD GROUP ANTIBODIES SERPL: NORMAL
BUN SERPL-MCNC: 6 MG/DL (ref 7–20)
CALCIUM SERPL-MCNC: 8.6 MG/DL (ref 8.3–10.6)
CHLORIDE SERPL-SCNC: 107 MMOL/L (ref 99–110)
CO2 SERPL-SCNC: 19 MMOL/L (ref 21–32)
CREAT SERPL-MCNC: 0.6 MG/DL (ref 0.6–1.1)
DEPRECATED RDW RBC AUTO: 15 % (ref 12.4–15.4)
EOSINOPHIL # BLD: 0.2 K/UL (ref 0–0.6)
EOSINOPHIL NFR BLD: 1.9 %
GFR SERPLBLD CREATININE-BSD FMLA CKD-EPI: >60 ML/MIN/{1.73_M2}
GLUCOSE SERPL-MCNC: 114 MG/DL (ref 70–99)
HCT VFR BLD AUTO: 28.6 % (ref 36–48)
HGB BLD-MCNC: 9.8 G/DL (ref 12–16)
LYMPHOCYTES # BLD: 2.1 K/UL (ref 1–5.1)
LYMPHOCYTES NFR BLD: 21.5 %
MCH RBC QN AUTO: 30.9 PG (ref 26–34)
MCHC RBC AUTO-ENTMCNC: 34.4 G/DL (ref 31–36)
MCV RBC AUTO: 89.8 FL (ref 80–100)
MONOCYTES # BLD: 0.5 K/UL (ref 0–1.3)
MONOCYTES NFR BLD: 5.1 %
NEUTROPHILS # BLD: 7 K/UL (ref 1.7–7.7)
NEUTROPHILS NFR BLD: 70.7 %
PLATELET # BLD AUTO: 330 K/UL (ref 135–450)
PMV BLD AUTO: 7.8 FL (ref 5–10.5)
POTASSIUM SERPL-SCNC: 3.9 MMOL/L (ref 3.5–5.1)
PROT SERPL-MCNC: 6.1 G/DL (ref 6.4–8.2)
RBC # BLD AUTO: 3.18 M/UL (ref 4–5.2)
SODIUM SERPL-SCNC: 137 MMOL/L (ref 136–145)
WBC # BLD AUTO: 9.9 K/UL (ref 4–11)

## 2023-08-10 PROCEDURE — 2580000003 HC RX 258: Performed by: OBSTETRICS & GYNECOLOGY

## 2023-08-10 PROCEDURE — 6360000002 HC RX W HCPCS

## 2023-08-10 PROCEDURE — 2580000003 HC RX 258

## 2023-08-10 PROCEDURE — 96375 TX/PRO/DX INJ NEW DRUG ADDON: CPT

## 2023-08-10 PROCEDURE — 7100000000 HC PACU RECOVERY - FIRST 15 MIN: Performed by: OBSTETRICS & GYNECOLOGY

## 2023-08-10 PROCEDURE — 3600000003 HC SURGERY LEVEL 3 BASE: Performed by: OBSTETRICS & GYNECOLOGY

## 2023-08-10 PROCEDURE — 2580000003 HC RX 258: Performed by: PHYSICIAN ASSISTANT

## 2023-08-10 PROCEDURE — 7100000010 HC PHASE II RECOVERY - FIRST 15 MIN: Performed by: OBSTETRICS & GYNECOLOGY

## 2023-08-10 PROCEDURE — 36415 COLL VENOUS BLD VENIPUNCTURE: CPT

## 2023-08-10 PROCEDURE — 96374 THER/PROPH/DIAG INJ IV PUSH: CPT

## 2023-08-10 PROCEDURE — 2500000003 HC RX 250 WO HCPCS: Performed by: OBSTETRICS & GYNECOLOGY

## 2023-08-10 PROCEDURE — 6360000002 HC RX W HCPCS: Performed by: PHYSICIAN ASSISTANT

## 2023-08-10 PROCEDURE — 3700000001 HC ADD 15 MINUTES (ANESTHESIA): Performed by: OBSTETRICS & GYNECOLOGY

## 2023-08-10 PROCEDURE — 86850 RBC ANTIBODY SCREEN: CPT

## 2023-08-10 PROCEDURE — 76801 OB US < 14 WKS SINGLE FETUS: CPT

## 2023-08-10 PROCEDURE — 3700000000 HC ANESTHESIA ATTENDED CARE: Performed by: OBSTETRICS & GYNECOLOGY

## 2023-08-10 PROCEDURE — 3600000013 HC SURGERY LEVEL 3 ADDTL 15MIN: Performed by: OBSTETRICS & GYNECOLOGY

## 2023-08-10 PROCEDURE — 86900 BLOOD TYPING SEROLOGIC ABO: CPT

## 2023-08-10 PROCEDURE — 6360000002 HC RX W HCPCS: Performed by: ANESTHESIOLOGY

## 2023-08-10 PROCEDURE — 85025 COMPLETE CBC W/AUTO DIFF WBC: CPT

## 2023-08-10 PROCEDURE — 2709999900 HC NON-CHARGEABLE SUPPLY: Performed by: OBSTETRICS & GYNECOLOGY

## 2023-08-10 PROCEDURE — 7100000011 HC PHASE II RECOVERY - ADDTL 15 MIN: Performed by: OBSTETRICS & GYNECOLOGY

## 2023-08-10 PROCEDURE — 99284 EMERGENCY DEPT VISIT MOD MDM: CPT

## 2023-08-10 PROCEDURE — 7100000001 HC PACU RECOVERY - ADDTL 15 MIN: Performed by: OBSTETRICS & GYNECOLOGY

## 2023-08-10 PROCEDURE — 86870 RBC ANTIBODY IDENTIFICATION: CPT

## 2023-08-10 PROCEDURE — 80053 COMPREHEN METABOLIC PANEL: CPT

## 2023-08-10 PROCEDURE — 86901 BLOOD TYPING SEROLOGIC RH(D): CPT

## 2023-08-10 PROCEDURE — 2500000003 HC RX 250 WO HCPCS

## 2023-08-10 PROCEDURE — 6370000000 HC RX 637 (ALT 250 FOR IP): Performed by: ANESTHESIOLOGY

## 2023-08-10 PROCEDURE — 96376 TX/PRO/DX INJ SAME DRUG ADON: CPT

## 2023-08-10 PROCEDURE — 88305 TISSUE EXAM BY PATHOLOGIST: CPT

## 2023-08-10 RX ORDER — SODIUM CHLORIDE, SODIUM LACTATE, POTASSIUM CHLORIDE, CALCIUM CHLORIDE 600; 310; 30; 20 MG/100ML; MG/100ML; MG/100ML; MG/100ML
INJECTION, SOLUTION INTRAVENOUS CONTINUOUS
Status: DISCONTINUED | OUTPATIENT
Start: 2023-08-10 | End: 2023-08-10 | Stop reason: HOSPADM

## 2023-08-10 RX ORDER — HYDROMORPHONE HYDROCHLORIDE 1 MG/ML
0.5 INJECTION, SOLUTION INTRAMUSCULAR; INTRAVENOUS; SUBCUTANEOUS ONCE
Status: COMPLETED | OUTPATIENT
Start: 2023-08-10 | End: 2023-08-10

## 2023-08-10 RX ORDER — IBUPROFEN 800 MG/1
800 TABLET ORAL
Qty: 30 TABLET | Refills: 0 | Status: SHIPPED | OUTPATIENT
Start: 2023-08-10

## 2023-08-10 RX ORDER — LIDOCAINE HYDROCHLORIDE 20 MG/ML
INJECTION, SOLUTION INFILTRATION; PERINEURAL PRN
Status: DISCONTINUED | OUTPATIENT
Start: 2023-08-10 | End: 2023-08-10 | Stop reason: SDUPTHER

## 2023-08-10 RX ORDER — SODIUM CHLORIDE 0.9 % (FLUSH) 0.9 %
5-40 SYRINGE (ML) INJECTION PRN
Status: DISCONTINUED | OUTPATIENT
Start: 2023-08-10 | End: 2023-08-10 | Stop reason: HOSPADM

## 2023-08-10 RX ORDER — PROPOFOL 10 MG/ML
INJECTION, EMULSION INTRAVENOUS PRN
Status: DISCONTINUED | OUTPATIENT
Start: 2023-08-10 | End: 2023-08-10 | Stop reason: SDUPTHER

## 2023-08-10 RX ORDER — SODIUM CHLORIDE 9 MG/ML
INJECTION, SOLUTION INTRAVENOUS PRN
Status: DISCONTINUED | OUTPATIENT
Start: 2023-08-10 | End: 2023-08-10 | Stop reason: HOSPADM

## 2023-08-10 RX ORDER — KETOROLAC TROMETHAMINE 30 MG/ML
INJECTION, SOLUTION INTRAMUSCULAR; INTRAVENOUS
Status: COMPLETED
Start: 2023-08-10 | End: 2023-08-10

## 2023-08-10 RX ORDER — KETOROLAC TROMETHAMINE 30 MG/ML
30 INJECTION, SOLUTION INTRAMUSCULAR; INTRAVENOUS ONCE
Status: COMPLETED | OUTPATIENT
Start: 2023-08-10 | End: 2023-08-10

## 2023-08-10 RX ORDER — PHENYLEPHRINE HCL IN 0.9% NACL 1 MG/10 ML
SYRINGE (ML) INTRAVENOUS PRN
Status: DISCONTINUED | OUTPATIENT
Start: 2023-08-10 | End: 2023-08-10 | Stop reason: SDUPTHER

## 2023-08-10 RX ORDER — FENTANYL CITRATE 50 UG/ML
INJECTION, SOLUTION INTRAMUSCULAR; INTRAVENOUS PRN
Status: DISCONTINUED | OUTPATIENT
Start: 2023-08-10 | End: 2023-08-10 | Stop reason: SDUPTHER

## 2023-08-10 RX ORDER — OXYCODONE HYDROCHLORIDE 5 MG/1
5 TABLET ORAL PRN
Status: DISCONTINUED | OUTPATIENT
Start: 2023-08-10 | End: 2023-08-10 | Stop reason: HOSPADM

## 2023-08-10 RX ORDER — DOXYCYCLINE HYCLATE 100 MG
200 TABLET ORAL ONCE
Status: DISCONTINUED | OUTPATIENT
Start: 2023-08-10 | End: 2023-08-10 | Stop reason: HOSPADM

## 2023-08-10 RX ORDER — SUCCINYLCHOLINE CHLORIDE 20 MG/ML
INJECTION INTRAMUSCULAR; INTRAVENOUS PRN
Status: DISCONTINUED | OUTPATIENT
Start: 2023-08-10 | End: 2023-08-10 | Stop reason: SDUPTHER

## 2023-08-10 RX ORDER — MIDAZOLAM HYDROCHLORIDE 1 MG/ML
INJECTION INTRAMUSCULAR; INTRAVENOUS PRN
Status: DISCONTINUED | OUTPATIENT
Start: 2023-08-10 | End: 2023-08-10 | Stop reason: SDUPTHER

## 2023-08-10 RX ORDER — ONDANSETRON 2 MG/ML
4 INJECTION INTRAMUSCULAR; INTRAVENOUS EVERY 30 MIN PRN
Status: DISCONTINUED | OUTPATIENT
Start: 2023-08-10 | End: 2023-08-10 | Stop reason: HOSPADM

## 2023-08-10 RX ORDER — FERROUS SULFATE 325(65) MG
325 TABLET ORAL
Qty: 30 TABLET | Refills: 0 | Status: SHIPPED | OUTPATIENT
Start: 2023-08-10

## 2023-08-10 RX ORDER — OXYCODONE HYDROCHLORIDE 5 MG/1
10 TABLET ORAL PRN
Status: DISCONTINUED | OUTPATIENT
Start: 2023-08-10 | End: 2023-08-10 | Stop reason: HOSPADM

## 2023-08-10 RX ORDER — ONDANSETRON 2 MG/ML
INJECTION INTRAMUSCULAR; INTRAVENOUS PRN
Status: DISCONTINUED | OUTPATIENT
Start: 2023-08-10 | End: 2023-08-10 | Stop reason: SDUPTHER

## 2023-08-10 RX ORDER — DOXYCYCLINE 100 MG/10ML
100 INJECTION, POWDER, LYOPHILIZED, FOR SOLUTION INTRAVENOUS ONCE
Status: DISCONTINUED | OUTPATIENT
Start: 2023-08-10 | End: 2023-08-10 | Stop reason: SDUPTHER

## 2023-08-10 RX ORDER — SODIUM CHLORIDE 9 MG/ML
1000 INJECTION, SOLUTION INTRAVENOUS CONTINUOUS
Status: DISCONTINUED | OUTPATIENT
Start: 2023-08-10 | End: 2023-08-10 | Stop reason: HOSPADM

## 2023-08-10 RX ORDER — DEXAMETHASONE SODIUM PHOSPHATE 4 MG/ML
INJECTION, SOLUTION INTRA-ARTICULAR; INTRALESIONAL; INTRAMUSCULAR; INTRAVENOUS; SOFT TISSUE PRN
Status: DISCONTINUED | OUTPATIENT
Start: 2023-08-10 | End: 2023-08-10 | Stop reason: SDUPTHER

## 2023-08-10 RX ORDER — SODIUM CHLORIDE, SODIUM LACTATE, POTASSIUM CHLORIDE, CALCIUM CHLORIDE 600; 310; 30; 20 MG/100ML; MG/100ML; MG/100ML; MG/100ML
INJECTION, SOLUTION INTRAVENOUS CONTINUOUS PRN
Status: DISCONTINUED | OUTPATIENT
Start: 2023-08-10 | End: 2023-08-10 | Stop reason: SDUPTHER

## 2023-08-10 RX ORDER — SODIUM CHLORIDE 0.9 % (FLUSH) 0.9 %
5-40 SYRINGE (ML) INJECTION EVERY 12 HOURS SCHEDULED
Status: DISCONTINUED | OUTPATIENT
Start: 2023-08-10 | End: 2023-08-10 | Stop reason: HOSPADM

## 2023-08-10 RX ADMIN — LIDOCAINE HYDROCHLORIDE 60 MG: 20 INJECTION, SOLUTION INFILTRATION; PERINEURAL at 16:23

## 2023-08-10 RX ADMIN — Medication 100 MCG: at 16:40

## 2023-08-10 RX ADMIN — MIDAZOLAM 2 MG: 1 INJECTION INTRAMUSCULAR; INTRAVENOUS at 16:20

## 2023-08-10 RX ADMIN — KETOROLAC TROMETHAMINE 30 MG: 30 INJECTION, SOLUTION INTRAMUSCULAR; INTRAVENOUS at 17:57

## 2023-08-10 RX ADMIN — OXYCODONE HYDROCHLORIDE 10 MG: 5 TABLET ORAL at 17:50

## 2023-08-10 RX ADMIN — SODIUM CHLORIDE, SODIUM LACTATE, POTASSIUM CHLORIDE, AND CALCIUM CHLORIDE: .6; .31; .03; .02 INJECTION, SOLUTION INTRAVENOUS at 16:23

## 2023-08-10 RX ADMIN — Medication 100 MCG: at 16:32

## 2023-08-10 RX ADMIN — HYDROMORPHONE HYDROCHLORIDE 0.5 MG: 1 INJECTION, SOLUTION INTRAMUSCULAR; INTRAVENOUS; SUBCUTANEOUS at 17:56

## 2023-08-10 RX ADMIN — Medication 100 MCG: at 16:37

## 2023-08-10 RX ADMIN — PROPOFOL 200 MG: 10 INJECTION, EMULSION INTRAVENOUS at 16:23

## 2023-08-10 RX ADMIN — HYDROMORPHONE HYDROCHLORIDE 0.5 MG: 1 INJECTION, SOLUTION INTRAMUSCULAR; INTRAVENOUS; SUBCUTANEOUS at 17:23

## 2023-08-10 RX ADMIN — FENTANYL CITRATE 100 MCG: 50 INJECTION, SOLUTION INTRAMUSCULAR; INTRAVENOUS at 16:23

## 2023-08-10 RX ADMIN — DEXAMETHASONE SODIUM PHOSPHATE 8 MG: 4 INJECTION, SOLUTION INTRAMUSCULAR; INTRAVENOUS at 16:26

## 2023-08-10 RX ADMIN — SUCCINYLCHOLINE CHLORIDE 80 MG: 20 INJECTION, SOLUTION INTRAMUSCULAR; INTRAVENOUS at 16:24

## 2023-08-10 RX ADMIN — DOXYCYCLINE 100 MG: 100 INJECTION, POWDER, LYOPHILIZED, FOR SOLUTION INTRAVENOUS at 16:59

## 2023-08-10 RX ADMIN — HYDROMORPHONE HYDROCHLORIDE 0.5 MG: 1 INJECTION, SOLUTION INTRAMUSCULAR; INTRAVENOUS; SUBCUTANEOUS at 13:35

## 2023-08-10 RX ADMIN — KETOROLAC TROMETHAMINE 30 MG: 30 INJECTION, SOLUTION INTRAMUSCULAR at 17:57

## 2023-08-10 RX ADMIN — SODIUM CHLORIDE 1000 ML: 9 INJECTION, SOLUTION INTRAVENOUS at 13:50

## 2023-08-10 RX ADMIN — Medication 100 MCG: at 16:35

## 2023-08-10 RX ADMIN — ONDANSETRON 4 MG: 2 INJECTION INTRAMUSCULAR; INTRAVENOUS at 16:29

## 2023-08-10 RX ADMIN — Medication 100 MCG: at 16:29

## 2023-08-10 ASSESSMENT — PAIN DESCRIPTION - DESCRIPTORS
DESCRIPTORS: CRAMPING

## 2023-08-10 ASSESSMENT — PAIN DESCRIPTION - LOCATION
LOCATION: ABDOMEN

## 2023-08-10 ASSESSMENT — PAIN SCALES - GENERAL
PAINLEVEL_OUTOF10: 8
PAINLEVEL_OUTOF10: 9
PAINLEVEL_OUTOF10: 8
PAINLEVEL_OUTOF10: 7

## 2023-08-10 ASSESSMENT — PAIN DESCRIPTION - ONSET: ONSET: ON-GOING

## 2023-08-10 ASSESSMENT — PAIN - FUNCTIONAL ASSESSMENT: PAIN_FUNCTIONAL_ASSESSMENT: 0-10

## 2023-08-10 ASSESSMENT — PAIN DESCRIPTION - ORIENTATION: ORIENTATION: MID

## 2023-08-10 ASSESSMENT — PAIN DESCRIPTION - FREQUENCY: FREQUENCY: CONTINUOUS

## 2023-08-10 NOTE — OP NOTE
Operative Note      Patient: Kelli Gee  YOB: 1980  MRN: [id number]    Date of Procedure: 8/10/2023    Pre-Op Diagnosis Codes: retained products of conception    Post-Op Diagnosis: Same       Procedure(s):  SUCTION DILATATION AND CURETTAGE WITH RETAINED PRODUCTS OF CONCEPTION    Surgeon(s):  Adelaida Arora MD    Assistant:   Surgical Assistant: Radha Morales    Anesthesia: General    Estimated Blood Loss (mL): Minimal    Complications: None    Specimens:   ID Type Source Tests Collected by Time Destination   A : PRODUCTS OF CONCEPTION Tissue Products of [address] Adelaida Arora MD 8/10/2023 1636      Findings: intrauterine contents consistent with retained placenta tissue    Detailed Description of Procedure:   Patient was taken to the operating room where she was given general anesthesia. She was placed in high lithotomy position. Vagina and surrounding areas were prepped and draped in sterile manner. Bladder was drained. Time out was performed. Speculum was placed and cervix was identified. A single toothed tenaculum was placed on the anterior lip of the cervix. A #10 curved suction curettage was introduced into uterine cavity. Suction curettage was performed followed by sharp curettage, then suction curettage again. Products of conception were collected and sent to pathology for evaluation. All instruments were removed and hemostasis was obtained with pressure. Patient was awakened from anesthesia and taken to recovery room in good condition. She tolerated the procedure well and will follow up in the office in 1 week.     Electronically signed by Adelaida Arora MD on 8/10/2023 at 4:50 PM

## 2023-08-10 NOTE — ANESTHESIA POSTPROCEDURE EVALUATION
Department of Anesthesiology  Postprocedure Note    Patient: Rodney Sawant  MRN: [id number]  YOB: 1980  Date of evaluation: 8/10/2023      Procedure Summary     Date: 08/10/23 Room / Location: [facility] [address] [place] [address]    Anesthesia Start:  Anesthesia Stop:     Procedure: SUCTION DILATATION AND CURETTAGE WITH RETAINED PRODUCTS OF CONCEPTION (Uterus) Diagnosis:       Missed       (Missed  [O02.1])    Surgeons: Wayne Talbot MD Responsible Provider: Patricio Ansari MD    Anesthesia Type: General ASA Status: 3          Anesthesia Type: General    Geraldo Phase I: Geraldo Score: 9    Geraldo Phase II: Geraldo Score: 10      Anesthesia Post Evaluation    Patient location during evaluation: PACU  Level of consciousness: awake and alert  Airway patency: patent  Nausea & Vomiting: no vomiting  Complications: no  Cardiovascular status: blood pressure returned to baseline  Hydration status: stable  Pain management: adequate

## 2023-08-10 NOTE — PROGRESS NOTES
Patient meets criteria for discharge per policy. Praveena Nieto Discharge instructions previously given to pt's family per Jennifer Parmar RN. verbalized understanding. PIV removed. Patient discharged via wheelchair to the care of their family in stable condition.

## 2023-08-10 NOTE — ED PROVIDER NOTES
[facility]        Pt Name: Viki Elliott  MRN: [id number]  [facility] 1980  Date of evaluation: 8/10/2023  Provider: SUZETTE Owens  PCP: BARB Cuevas CNP  Note Started: 3:35 PM EDT 8/10/23       I have seen and evaluated this patient with my supervising physician Claudio Voss, [address]       Chief Complaint   Patient presents with    Vaginal Bleeding     Patient to the ED via [address] [facility] for vaginal bleeding.  [facility] today after a miscarrage on 7/31 and hospitalization for bleeding as well. States today she was at the appointment and they clipped a piece of tissue and she started bleeding again. EMS started an IV and gave about 800ml's of fluid. Normotensive the entire transport. EMS reports office states about 500ml's of blood loss. HISTORY OF PRESENT ILLNESS: 1 or more Elements     History from : Patient    Limitations to history : None    Viki Elliott is a 41 y.o. female who presents to the emergency department complaining of vaginal bleeding. Patient states a week ago she was flown from [place] [facility] to [facility] for miscarriage. She was ultimately admitted to the ICU due to receiving massive blood transfusion. She did not undergo a D&C. She was discharged home in stable condition and had follow-up today on an outpatient basis at [facility]. She was evaluated by Dr. Saw Zamarripa today. The patient states prior to her visit she was feeling well and had scant bleeding. She thought she was going today for clearance to return to work. However the patient reports during the speculum exam apparently tissue was being removed and patient began heavily bleeding therefore she was transported via EMS to the emergency department for further evaluation. She reports she has packing in her vagina to stop the bleeding. She is complaining of abdominal cramping.     Nursing Notes were no administration in time range)   sodium chloride flush 0.9 % injection 5-40 mL (has no administration in time range)   sodium chloride flush 0.9 % injection 5-40 mL (has no administration in time range)   0.9 % sodium chloride infusion (has no administration in time range)   HYDROmorphone (DILAUDID) injection 0.25 mg (has no administration in time range)   HYDROmorphone (DILAUDID) injection 0.5 mg (0.5 mg IntraVENous Given 8/10/23 1756)   oxyCODONE (ROXICODONE) immediate release tablet 5 mg ( Oral See Alternative 8/10/23 1750)     Or   oxyCODONE (ROXICODONE) immediate release tablet 10 mg (10 mg Oral Given 8/10/23 1750)   ondansetron (ZOFRAN) injection 4 mg (has no administration in time range)   HYDROmorphone HCl PF (DILAUDID) injection 0.5 mg (0.5 mg IntraVENous Given 8/10/23 1335)   doxycycline (VIBRAMYCIN) 100 mg in sodium chloride 0.9 % 100 mL IVPB (100 mg IntraVENous New Bag 8/10/23 1659)   ketorolac (TORADOL) injection 30 mg (30 mg IntraVENous Given 8/10/23 1757)             Is this patient to be included in the SEP-1 Core Measure due to severe sepsis or septic shock? No   Exclusion criteria - the patient is NOT to be included for SEP-1 Core Measure due to: Infection is not suspected    CONSULTS: (Who and What was discussed)  None          Records Reviewed : Source patient was admitted to the hospital last week for possible miscarriage. She did receive blood transfusion. CC/HPI Summary, DDx, ED Course, and Reassessment: Patient was evaluated today in the emergency department today for excessive vaginal bleeding. She came directly from the [facility] office. Patient apparently has packing in place due to the excessive bleeding therefore I will defer any pelvic exam to the [facility] doctor on-call. Will plan to obtain lab work, monitor vitals and hemoglobin level and ultrasound to evaluate for retained products. The patient was evaluated by Dr. Margy Hernandez with OB/GYN in the emergency department.   She performed a

## 2023-08-10 NOTE — DISCHARGE INSTRUCTIONS
Vacuum Aspiration for Miscarriage: Care Instructions  Overview  Vacuum aspiration uses gentle suction to empty the uterus after a miscarriage. Many miscarriages pass on their own, but some don't. With an incomplete miscarriage, some of the pregnancy tissue stays in the uterus. With a missed miscarriage, all of the tissue stays in the uterus. You may have manual or electric vacuum aspiration. With manual vacuum, the doctor uses a specially designed syringe to apply suction. With electric vacuum, a thin tube is attached to a pump that provides suction. After the procedure, you may have bleeding and spotting. These symptoms usually don't last more than a few days. You also may have cramps that feel like menstrual cramps. Cramping may last up to a few weeks. It's common to have many different emotions after a miscarriage. It's also common to want to know why a miscarriage has happened. Hormonal changes during pregnancy can make emotions stronger than usual. These feelings can last awhile. Follow-up care is a key part of your treatment and safety. Be sure to make and go to all appointments, and call your doctor if you are having problems. It's also a good idea to know your test results and keep a list of the medicines you take. How can you care for yourself at home? Rest as much as you can. Getting enough rest will help you recover. Ask your doctor when you can return to normal activities and strenuous exercise. Most people can return to normal activities 1 to 2 days after the procedure. Be safe with medicines. Take medicines exactly as directed  Use sanitary pads until you stop bleeding. Using pads makes it easier to monitor your bleeding. Do not rinse inside your vagina with fluid (douche). This could increase your risk of infections that can lead to pelvic inflammatory disease. Ask your doctor when it is okay to have vaginal sex. You can get pregnant in the weeks after a vacuum aspiration.  If you don't

## 2023-08-10 NOTE — H&P
Subjective:     Patient is a 41 y.o. [id number] s/p spontaneous miscarriage 78 complicated by hemorrhage requiring blood transfusion. Patient was discharged on 23 and reports period-like bleeding until follow up today with Beau Roberto. During exam she was found to have what appeared to be products of conception at cervical os, which was dilated about 1cm. After removal of tissue patient began to have heavy bleeding again. Vagina was packed and patient was transferred to [facility] via ambulance. In ER, patient reports period-like bleeding. Patient Active Problem List    Diagnosis Date Noted    Vaginal bleeding 2023    Chronic pain of left ankle 2017    Other specified indication for care or intervention related to labor and delivery, unspecified as to episode of care 2014     Past Medical History:   Diagnosis Date    HPV in female     Hx of migraines      contractions     was on PO Brethine. Stopped at 36 wks    Smoker       Past Surgical History:   Procedure Laterality Date    CHOLECYSTECTOMY      TONSILLECTOMY      TYMPANOSTOMY TUBE PLACEMENT        Not in a hospital admission. Allergies   Allergen Reactions    Amoxicillin Hives    Codeine Hives    Erythrocin Hives    Lorabid [Loracarbef] Hives    Penicillins Hives    Sulfa Antibiotics Hives      Social History     Tobacco Use    Smoking status: Former     Packs/day: 0.50     Years: 5.00     Pack years: 2.50     Types: Cigarettes    Smokeless tobacco: Never   Substance Use Topics    Alcohol use: Not Currently      Family History   Problem Relation Age of Onset    Arthritis Mother     Cancer Maternal Grandfather         bladder    High Blood Pressure Maternal Grandmother     Stroke Maternal Grandmother       Review of Systems  No shortness of breath, no chest pain, no dizziness. + cramping.   Patient reports minimal vaginal bleeding although physical exam is consistent with ongoing bleeding (see products within the endometrial cavity  rather than retained products of conception. 2. New echogenic and shadowing area in the vicinity of the cervix measuring  as much as 4.4 cm. This presumably represents air within the adjacent vagina  or within the cervical canal.  Calcifications could have a similar  appearance, but were not present previously and considered very unlikely. 3. No acute findings elsewhere in the pelvis with no evidence of ovarian  torsion. Assessment:     Post-miscarriage hemorrhage, concerning for retained products of conception    Plan:     Suction D&C- patient was counseled expectations and risks. All questions were answered to her satisfaction. Written consent was obtained.   To OR now    Manoj Kumar MD

## 2023-08-10 NOTE — ED NOTES
Prior to pt getting To the ER.    called, I spoke to her. and she said we are to expect a pt coming over here. That the pt had a miscarriage some time ago and they believe pt is still have products of conception and most likely need an D&C. Pt also had a recent blood transfusion and there could be a possibility of her needing a blood transfusion as well. Pt coming from [facility].

## 2023-12-24 ENCOUNTER — HOSPITAL ENCOUNTER (EMERGENCY)
Age: 43
Discharge: HOME | End: 2023-12-24
Attending: EMERGENCY MEDICINE
Payer: MEDICAID

## 2023-12-24 VITALS
BODY MASS INDEX: 33.99 KG/M2 | SYSTOLIC BLOOD PRESSURE: 162 MMHG | DIASTOLIC BLOOD PRESSURE: 123 MMHG | OXYGEN SATURATION: 98 % | TEMPERATURE: 98 F | RESPIRATION RATE: 18 BRPM | HEIGHT: 61 IN | HEART RATE: 95 BPM | WEIGHT: 180 LBS

## 2023-12-24 DIAGNOSIS — K02.9 DENTAL CARIES: ICD-10-CM

## 2023-12-24 DIAGNOSIS — K03.81 CRACKED TOOTH: Primary | ICD-10-CM

## 2023-12-24 DIAGNOSIS — L03.211 FACIAL CELLULITIS: ICD-10-CM

## 2023-12-24 PROCEDURE — 99283 EMERGENCY DEPT VISIT LOW MDM: CPT

## 2023-12-24 PROCEDURE — 64400 NJX AA&/STRD TRIGEMINAL NRV: CPT

## 2023-12-24 PROCEDURE — 6370000000 HC RX 637 (ALT 250 FOR IP): Performed by: EMERGENCY MEDICINE

## 2023-12-24 RX ORDER — CLINDAMYCIN HYDROCHLORIDE 150 MG/1
300 CAPSULE ORAL ONCE
Status: COMPLETED | OUTPATIENT
Start: 2023-12-24 | End: 2023-12-24

## 2023-12-24 RX ORDER — IBUPROFEN 600 MG/1
600 TABLET ORAL ONCE
Status: COMPLETED | OUTPATIENT
Start: 2023-12-24 | End: 2023-12-24

## 2023-12-24 RX ORDER — LIDOCAINE HYDROCHLORIDE 20 MG/ML
5 SOLUTION OROPHARYNGEAL EVERY 4 HOURS PRN
Qty: 1 EACH | Refills: 0 | Status: SHIPPED | OUTPATIENT
Start: 2023-12-24 | End: 2023-12-29

## 2023-12-24 RX ORDER — CHLORHEXIDINE GLUCONATE ORAL RINSE 1.2 MG/ML
15 SOLUTION DENTAL 3 TIMES DAILY
Qty: 630 ML | Refills: 0 | Status: SHIPPED | OUTPATIENT
Start: 2023-12-24 | End: 2024-01-07

## 2023-12-24 RX ORDER — ACETAMINOPHEN AND CODEINE PHOSPHATE 120; 12 MG/5ML; MG/5ML
1 SOLUTION ORAL DAILY
COMMUNITY
Start: 2023-12-18

## 2023-12-24 RX ORDER — IBUPROFEN 600 MG/1
600 TABLET ORAL 4 TIMES DAILY PRN
Qty: 40 TABLET | Refills: 0 | Status: SHIPPED | OUTPATIENT
Start: 2023-12-24

## 2023-12-24 RX ORDER — CLINDAMYCIN HYDROCHLORIDE 300 MG/1
300 CAPSULE ORAL 4 TIMES DAILY
Qty: 28 CAPSULE | Refills: 0 | Status: SHIPPED | OUTPATIENT
Start: 2023-12-24 | End: 2023-12-31

## 2023-12-24 RX ADMIN — CLINDAMYCIN HYDROCHLORIDE 300 MG: 150 CAPSULE ORAL at 20:35

## 2023-12-24 RX ADMIN — IBUPROFEN 600 MG: 600 TABLET, FILM COATED ORAL at 20:24

## 2023-12-24 RX ADMIN — CLINDAMYCIN HYDROCHLORIDE 300 MG: 150 CAPSULE ORAL at 20:24

## 2023-12-24 ASSESSMENT — PAIN SCALES - GENERAL: PAINLEVEL_OUTOF10: 7

## 2023-12-24 ASSESSMENT — LIFESTYLE VARIABLES
HOW OFTEN DO YOU HAVE A DRINK CONTAINING ALCOHOL: NEVER
HOW MANY STANDARD DRINKS CONTAINING ALCOHOL DO YOU HAVE ON A TYPICAL DAY: PATIENT DOES NOT DRINK

## 2023-12-24 ASSESSMENT — PAIN - FUNCTIONAL ASSESSMENT: PAIN_FUNCTIONAL_ASSESSMENT: 0-10

## 2023-12-25 NOTE — ED PROVIDER NOTES
[facility]      Pt Name: Viki Elliott  MRN: [id number]  [address] [facility] 1980  Date of evaluation: 12/24/2023  Provider: Boo Salcedo MD    CHIEF COMPLAINT       Chief Complaint   Patient presents with    Dental Pain     Pt ambulates into ED with complaints of pain to upper left jaw. States she broke tooth earlier last week and couldn't get into dentist.  States the tooth started bothering her real bad tonight. HISTORY OF PRESENT ILLNESS   (Location/Symptom, Timing/Onset, Context/Setting, Quality, Duration, Modifying Factors, Severity)  Note limiting factors. Viki Elliott is a 41 y.o. female who presents to the emergency department with the chief complaint of   Chief Complaint   Patient presents with    Dental Pain     Pt ambulates into ED with complaints of pain to upper left jaw. States she broke tooth earlier last week and couldn't get into dentist.  States the tooth started bothering her real bad tonight. . 68-year-old female with past medical history as listed below presents to the ER for evaluation of left upper tooth pain. Patient states she fractured tooth several weeks ago and since then has been having intermittent pain in that tooth. Patient states yesterday she developed some swelling and aching sensation increased in severity. Patient states eating or chewing makes the pain worse. Patient's been taking over-the-counter Motrin with mild improvement of symptoms. Patient denies fevers, mouth swelling, inability to swallow or changes in voice. Patient states she is noted some swelling over her face. Nursing Notes were reviewed. REVIEW OF SYSTEMS    (2-9 systems for level 4, 10 or more for level 5)     Review of Systems   All other systems reviewed and are negative. Except as noted above the remainder of the review of systems was reviewed and negative.        PAST MEDICAL HISTORY     Past Medical History:

## 2023-12-25 NOTE — DISCHARGE INSTRUCTIONS
Please take your medication as prescribed.   If your symptoms worsen despite treatment please come back to the ER for reevaluation

## 2024-04-17 NOTE — ED NOTES
D/C: Order noted for d/c. Pt confirmed d/c paperwork does have correct name. Discharge and education instructions reviewed with patient. Teach-back successful. Pt verbalized understanding and signed d/c papers. Pt denied questions at this time. No acute distress noted. Patient instructed to follow-up as noted - return to emergency department if symptoms worsen. Patient verbalized understanding. Discharged per EDMD with discharge instructions. Pt discharged to private vehicle. Patient stable upon departure. Thanked patient for choosing [facility] for care. Provider aware of patient pain at time of discharge.        Nomi Jacinto RN  04/25/22 4412 Home with home care

## 2025-05-06 ENCOUNTER — APPOINTMENT (OUTPATIENT)
Dept: GENERAL RADIOLOGY | Age: 45
End: 2025-05-06
Payer: COMMERCIAL

## 2025-05-06 ENCOUNTER — HOSPITAL ENCOUNTER (EMERGENCY)
Age: 45
Discharge: HOME | End: 2025-05-06
Attending: STUDENT IN AN ORGANIZED HEALTH CARE EDUCATION/TRAINING PROGRAM
Payer: COMMERCIAL

## 2025-05-06 VITALS
BODY MASS INDEX: 33.99 KG/M2 | RESPIRATION RATE: 18 BRPM | HEIGHT: 61 IN | SYSTOLIC BLOOD PRESSURE: 164 MMHG | HEART RATE: 97 BPM | TEMPERATURE: 98.4 F | WEIGHT: 180 LBS | OXYGEN SATURATION: 98 % | DIASTOLIC BLOOD PRESSURE: 96 MMHG

## 2025-05-06 DIAGNOSIS — S93.402A SPRAIN OF LEFT ANKLE, UNSPECIFIED LIGAMENT, INITIAL ENCOUNTER: Primary | ICD-10-CM

## 2025-05-06 PROCEDURE — 73610 X-RAY EXAM OF ANKLE: CPT

## 2025-05-06 PROCEDURE — 99283 EMERGENCY DEPT VISIT LOW MDM: CPT

## 2025-05-06 RX ORDER — NAPROXEN 500 MG/1
500 TABLET ORAL 2 TIMES DAILY WITH MEALS
Qty: 20 TABLET | Refills: 0 | Status: SHIPPED | OUTPATIENT
Start: 2025-05-06 | End: 2025-05-16

## 2025-05-06 RX ORDER — IBUPROFEN 600 MG/1
600 TABLET, FILM COATED ORAL ONCE
Status: DISCONTINUED | OUTPATIENT
Start: 2025-05-06 | End: 2025-05-06

## 2025-05-06 ASSESSMENT — PAIN DESCRIPTION - LOCATION: LOCATION: ANKLE

## 2025-05-06 ASSESSMENT — PAIN DESCRIPTION - PAIN TYPE: TYPE: ACUTE PAIN

## 2025-05-06 ASSESSMENT — PAIN DESCRIPTION - FREQUENCY: FREQUENCY: CONTINUOUS

## 2025-05-06 ASSESSMENT — PAIN DESCRIPTION - ONSET: ONSET: SUDDEN

## 2025-05-06 ASSESSMENT — PAIN - FUNCTIONAL ASSESSMENT
PAIN_FUNCTIONAL_ASSESSMENT: PREVENTS OR INTERFERES WITH MANY ACTIVE NOT PASSIVE ACTIVITIES
PAIN_FUNCTIONAL_ASSESSMENT: 0-10

## 2025-05-06 ASSESSMENT — PAIN DESCRIPTION - DESCRIPTORS: DESCRIPTORS: THROBBING

## 2025-05-06 ASSESSMENT — PAIN DESCRIPTION - ORIENTATION: ORIENTATION: LEFT

## 2025-05-06 ASSESSMENT — PAIN SCALES - GENERAL: PAINLEVEL_OUTOF10: 7

## 2025-05-06 NOTE — ED PROVIDER NOTES
History of Present Illness       Britney Nur is a 45 y.o. female with a   Past Medical History:   Diagnosis Date    HPV in female     Hx of migraines      contractions     was on PO Brethine. Stopped at 36 wks    Smoker     who presents to the emergency department today with pain in her left ankle.  Patient rolled her ankle at the gas station earlier today.  Patient denies neurologic deficits.  Denies any other trauma.      PMH     Family History   Problem Relation Age of Onset    Arthritis Mother     Cancer Maternal Grandfather         bladder    High Blood Pressure Maternal Grandmother     Stroke Maternal Grandmother      No current facility-administered medications for this encounter.     Current Outpatient Medications   Medication Sig Dispense Refill    naproxen (NAPROSYN) 500 MG tablet Take 1 tablet by mouth 2 times daily (with meals) for 10 days 20 tablet 0    norethindrone (MICRONOR) 0.35 MG tablet Take 1 tablet by mouth daily (Patient not taking: Reported on 2025)      ibuprofen (ADVIL;MOTRIN) 600 MG tablet Take 1 tablet by mouth 4 times daily as needed for Pain (Patient not taking: Reported on 2025) 40 tablet 0    ferrous sulfate (IRON 325) 325 (65 Fe) MG tablet Take 1 tablet by mouth daily (with breakfast) (Patient not taking: Reported on 2025) 30 tablet 0    fluticasone (FLONASE) 50 MCG/ACT nasal spray 1 spray by Nasal route daily (Patient not taking: Reported on 2025) 1 Bottle 0     Allergies   Allergen Reactions    Amoxicillin Hives    Codeine Hives    Erythrocin Hives    Lorabid [Loracarbef] Hives    Penicillins Hives    Sulfa Antibiotics Hives     Social History     Socioeconomic History    Marital status: Life Partner     Spouse name: Not on file    Number of children: Not on file    Years of education: Not on file    Highest education level: Not on file   Occupational History    Not on file   Tobacco Use    Smoking status: Every Day     Current packs/day: 0.50     Average

## 2025-06-09 ENCOUNTER — HOSPITAL ENCOUNTER (EMERGENCY)
Age: 45
Discharge: HOME | End: 2025-06-10
Attending: EMERGENCY MEDICINE
Payer: COMMERCIAL

## 2025-06-09 ENCOUNTER — APPOINTMENT (OUTPATIENT)
Dept: GENERAL RADIOLOGY | Age: 45
End: 2025-06-09

## 2025-06-09 DIAGNOSIS — R07.9 CHEST PAIN, UNSPECIFIED TYPE: Primary | ICD-10-CM

## 2025-06-09 LAB
ANION GAP SERPL CALCULATED.3IONS-SCNC: 14 MMOL/L (ref 3–16)
BASOPHILS # BLD: 0.1 K/UL (ref 0–0.2)
BASOPHILS NFR BLD: 0.8 %
BUN SERPL-MCNC: 7 MG/DL (ref 7–20)
CALCIUM SERPL-MCNC: 9.5 MG/DL (ref 8.3–10.6)
CHLORIDE SERPL-SCNC: 103 MMOL/L (ref 99–110)
CO2 SERPL-SCNC: 21 MMOL/L (ref 21–32)
CREAT SERPL-MCNC: 0.8 MG/DL (ref 0.6–1.1)
DEPRECATED RDW RBC AUTO: 15.9 % (ref 12.4–15.4)
EOSINOPHIL # BLD: 0.2 K/UL (ref 0–0.6)
EOSINOPHIL NFR BLD: 1.5 %
GFR SERPLBLD CREATININE-BSD FMLA CKD-EPI: >90 ML/MIN/{1.73_M2}
GLUCOSE SERPL-MCNC: 116 MG/DL (ref 70–99)
HCT VFR BLD AUTO: 41.7 % (ref 36–48)
HGB BLD-MCNC: 13.4 G/DL (ref 12–16)
LYMPHOCYTES # BLD: 3.1 K/UL (ref 1–5.1)
LYMPHOCYTES NFR BLD: 28 %
MCH RBC QN AUTO: 26.5 PG (ref 26–34)
MCHC RBC AUTO-ENTMCNC: 32.2 G/DL (ref 31–36)
MCV RBC AUTO: 82.3 FL (ref 80–100)
MONOCYTES # BLD: 0.8 K/UL (ref 0–1.3)
MONOCYTES NFR BLD: 6.9 %
NEUTROPHILS # BLD: 7 K/UL (ref 1.7–7.7)
NEUTROPHILS NFR BLD: 62.8 %
NT-PROBNP SERPL-MCNC: 210 PG/ML (ref 0–124)
PLATELET # BLD AUTO: 365 K/UL (ref 135–450)
PMV BLD AUTO: 8.1 FL (ref 5–10.5)
POTASSIUM SERPL-SCNC: 3.7 MMOL/L (ref 3.5–5.1)
RBC # BLD AUTO: 5.07 M/UL (ref 4–5.2)
SODIUM SERPL-SCNC: 138 MMOL/L (ref 136–145)
TROPONIN, HIGH SENSITIVITY: <6 NG/L (ref 0–14)
WBC # BLD AUTO: 11.2 K/UL (ref 4–11)

## 2025-06-09 PROCEDURE — 99285 EMERGENCY DEPT VISIT HI MDM: CPT

## 2025-06-09 PROCEDURE — 71045 X-RAY EXAM CHEST 1 VIEW: CPT

## 2025-06-09 PROCEDURE — 93005 ELECTROCARDIOGRAM TRACING: CPT | Performed by: EMERGENCY MEDICINE

## 2025-06-09 PROCEDURE — 84484 ASSAY OF TROPONIN QUANT: CPT

## 2025-06-09 PROCEDURE — 96374 THER/PROPH/DIAG INJ IV PUSH: CPT

## 2025-06-09 PROCEDURE — 6360000002 HC RX W HCPCS: Performed by: EMERGENCY MEDICINE

## 2025-06-09 PROCEDURE — 96375 TX/PRO/DX INJ NEW DRUG ADDON: CPT

## 2025-06-09 PROCEDURE — 80048 BASIC METABOLIC PNL TOTAL CA: CPT

## 2025-06-09 PROCEDURE — 85025 COMPLETE CBC W/AUTO DIFF WBC: CPT

## 2025-06-09 PROCEDURE — 83880 ASSAY OF NATRIURETIC PEPTIDE: CPT

## 2025-06-09 PROCEDURE — 36415 COLL VENOUS BLD VENIPUNCTURE: CPT

## 2025-06-09 RX ORDER — LABETALOL HYDROCHLORIDE 5 MG/ML
20 INJECTION, SOLUTION INTRAVENOUS ONCE
Status: COMPLETED | OUTPATIENT
Start: 2025-06-09 | End: 2025-06-09

## 2025-06-09 RX ORDER — DIAZEPAM 10 MG/2ML
5 INJECTION, SOLUTION INTRAMUSCULAR; INTRAVENOUS ONCE
Status: COMPLETED | OUTPATIENT
Start: 2025-06-09 | End: 2025-06-09

## 2025-06-09 RX ADMIN — DIAZEPAM 5 MG: 5 INJECTION, SOLUTION INTRAMUSCULAR; INTRAVENOUS at 22:33

## 2025-06-09 RX ADMIN — LABETALOL HYDROCHLORIDE 20 MG: 5 INJECTION, SOLUTION INTRAVENOUS at 23:25

## 2025-06-09 ASSESSMENT — PAIN DESCRIPTION - DESCRIPTORS: DESCRIPTORS: DISCOMFORT

## 2025-06-09 ASSESSMENT — PAIN SCALES - GENERAL: PAINLEVEL_OUTOF10: 6

## 2025-06-09 ASSESSMENT — PAIN DESCRIPTION - PAIN TYPE: TYPE: ACUTE PAIN

## 2025-06-09 ASSESSMENT — PAIN - FUNCTIONAL ASSESSMENT: PAIN_FUNCTIONAL_ASSESSMENT: 0-10

## 2025-06-09 ASSESSMENT — LIFESTYLE VARIABLES
HOW MANY STANDARD DRINKS CONTAINING ALCOHOL DO YOU HAVE ON A TYPICAL DAY: 1 OR 2
HOW OFTEN DO YOU HAVE A DRINK CONTAINING ALCOHOL: MONTHLY OR LESS

## 2025-06-09 ASSESSMENT — PAIN DESCRIPTION - LOCATION: LOCATION: CHEST

## 2025-06-09 ASSESSMENT — PAIN DESCRIPTION - ONSET: ONSET: GRADUAL

## 2025-06-09 ASSESSMENT — PAIN DESCRIPTION - FREQUENCY: FREQUENCY: CONTINUOUS

## 2025-06-10 VITALS
OXYGEN SATURATION: 95 % | RESPIRATION RATE: 19 BRPM | BODY MASS INDEX: 40.89 KG/M2 | TEMPERATURE: 98.1 F | DIASTOLIC BLOOD PRESSURE: 98 MMHG | WEIGHT: 216.6 LBS | SYSTOLIC BLOOD PRESSURE: 147 MMHG | HEIGHT: 61 IN | HEART RATE: 93 BPM

## 2025-06-10 LAB
EKG ATRIAL RATE: 108 BPM
EKG DIAGNOSIS: NORMAL
EKG P AXIS: 34 DEGREES
EKG P-R INTERVAL: 118 MS
EKG Q-T INTERVAL: 354 MS
EKG QRS DURATION: 80 MS
EKG QTC CALCULATION (BAZETT): 474 MS
EKG R AXIS: -6 DEGREES
EKG T AXIS: 53 DEGREES
EKG VENTRICULAR RATE: 108 BPM
TROPONIN, HIGH SENSITIVITY: <6 NG/L (ref 0–14)

## 2025-06-10 PROCEDURE — 96376 TX/PRO/DX INJ SAME DRUG ADON: CPT

## 2025-06-10 PROCEDURE — 6360000002 HC RX W HCPCS: Performed by: EMERGENCY MEDICINE

## 2025-06-10 PROCEDURE — 93010 ELECTROCARDIOGRAM REPORT: CPT | Performed by: INTERNAL MEDICINE

## 2025-06-10 RX ORDER — LABETALOL HYDROCHLORIDE 5 MG/ML
40 INJECTION, SOLUTION INTRAVENOUS ONCE
Status: COMPLETED | OUTPATIENT
Start: 2025-06-10 | End: 2025-06-10

## 2025-06-10 RX ADMIN — LABETALOL HYDROCHLORIDE 40 MG: 5 INJECTION, SOLUTION INTRAVENOUS at 00:05

## 2025-06-10 ASSESSMENT — PAIN - FUNCTIONAL ASSESSMENT: PAIN_FUNCTIONAL_ASSESSMENT: NONE - DENIES PAIN

## 2025-06-10 NOTE — ED NOTES
Pt was concerned about burning sensation around IV lock. Writer checked pt's IV and flushed it and good blood return without any problems. Pt stated sensation is gone. No infiltration to area at this time.

## 2025-06-10 NOTE — ED PROVIDER NOTES
[facility] EMERGENCY DEPARTMENT     Pt Name: Britney Nur   MRN: [id number]   Birthdate 1980   Date of evaluation: 2025   Provider: Babatunde Sanz MD   PCP: Pastora Olivarez APRN - CNP   Note Started: 10:14 PM EDT 25     TRIAGE CHIEF COMPLAINT:  Chief Complaint   Patient presents with    Chest Pain     Pt stated off and on over the last 2 days with increased left side chest pain today.      HISTORY OF PRESENT ILLNESS:  Britney Nur is a 45 y.o. female who presents to the emergency department with a 2-day history of intermittent left-sided chest pain.  She describes sharp stabbing pains lasting about 2 seconds at a time that come and go.  They are not associated with eating movement position breathing.  She has not had any respiratory symptoms recently fever cough.  She says she has been under a lot of stress recently and did quit smoking 5 days ago.  She says she has a history of hypertension at time but is never been treated for hypertension formally.  She has had no exertional symptoms no dyspnea nausea sweating.  She has been attending to her normal activities without limitation or symptoms.    REVIEW OF SYSTEMS:  See HPI for further details. Review of systems otherwise negative.    PROBLEM LIST:  Patient Active Problem List   Diagnosis    Other specified indication for care or intervention related to labor and delivery, unspecified as to episode of care    Chronic pain of left ankle    Vaginal bleeding     MEDICAL HISTORY:   has a past medical history of HPV in female, migraines,  contractions, and Smoker.    SURGICAL HISTORY:  Past Surgical History:   Procedure Laterality Date    CHOLECYSTECTOMY      DILATION AND CURETTAGE OF UTERUS N/A 08/10/2023    SUCTION DILATATION AND CURETTAGE WITH RETAINED PRODUCTS OF CONCEPTION performed by Smita Milan MD at [facility] OR    TONSILLECTOMY      TYMPANOSTOMY TUBE PLACEMENT        CURRENT MEDICATIONS:    Previous Medications    FERROUS

## 2025-07-14 NOTE — DISCHARGE INSTRUCTIONS
Use Naprosyn as prescribed.  Keep ankle in Aircast.  If your symptoms worsen or you develop new, concerning symptoms, please return to the emergency department.  Please follow-up with your PCP this week.  
I was physically present and participated in this delivery.

## (undated) DEVICE — SYSTEM COLL W/ TISS TRAP INCLUDE COLL CANSTR LID SET OF

## (undated) DEVICE — SET COLL TBNG L6FT DIA3/8IN W/ INTEGR SWVL HNDL SLIP RNG M

## (undated) DEVICE — CURETTE SURG VAC 10 MM CRV RIGID PLAS

## (undated) DEVICE — PACK,LAPAROSCOPY,PK II,SIRUS: Brand: MEDLINE

## (undated) DEVICE — D&C: Brand: MEDLINE INDUSTRIES, INC.

## (undated) DEVICE — TRAP TISS DISP FOR COLL SYS BERK SAFETOUCH

## (undated) DEVICE — GLOVE ORANGE PI 7   MSG9070